# Patient Record
Sex: FEMALE | Race: BLACK OR AFRICAN AMERICAN | Employment: OTHER | URBAN - METROPOLITAN AREA
[De-identification: names, ages, dates, MRNs, and addresses within clinical notes are randomized per-mention and may not be internally consistent; named-entity substitution may affect disease eponyms.]

---

## 2017-03-26 ENCOUNTER — ED HISTORICAL/CONVERTED ENCOUNTER (OUTPATIENT)
Dept: OTHER | Age: 62
End: 2017-03-26

## 2017-04-12 ENCOUNTER — OP HISTORICAL/CONVERTED ENCOUNTER (OUTPATIENT)
Dept: OTHER | Age: 62
End: 2017-04-12

## 2018-03-01 ENCOUNTER — OP HISTORICAL/CONVERTED ENCOUNTER (OUTPATIENT)
Dept: OTHER | Age: 63
End: 2018-03-01

## 2018-06-04 ENCOUNTER — OP HISTORICAL/CONVERTED ENCOUNTER (OUTPATIENT)
Dept: OTHER | Age: 63
End: 2018-06-04

## 2020-12-29 ENCOUNTER — HOSPITAL ENCOUNTER (EMERGENCY)
Age: 65
Discharge: LEFT BEFORE DISCHARGE | End: 2020-12-29
Payer: MEDICARE

## 2020-12-29 VITALS
OXYGEN SATURATION: 98 % | HEIGHT: 60 IN | HEART RATE: 86 BPM | TEMPERATURE: 98 F | BODY MASS INDEX: 45.16 KG/M2 | SYSTOLIC BLOOD PRESSURE: 180 MMHG | RESPIRATION RATE: 20 BRPM | WEIGHT: 230 LBS | DIASTOLIC BLOOD PRESSURE: 89 MMHG

## 2020-12-29 PROCEDURE — 75810000275 HC EMERGENCY DEPT VISIT NO LEVEL OF CARE

## 2022-10-28 ENCOUNTER — APPOINTMENT (OUTPATIENT)
Dept: GENERAL RADIOLOGY | Age: 67
End: 2022-10-28
Attending: EMERGENCY MEDICINE
Payer: MEDICARE

## 2022-10-28 ENCOUNTER — HOSPITAL ENCOUNTER (OUTPATIENT)
Age: 67
Setting detail: OBSERVATION
Discharge: HOME | End: 2022-10-29
Attending: EMERGENCY MEDICINE | Admitting: HOSPITALIST
Payer: MEDICARE

## 2022-10-28 DIAGNOSIS — R07.9 CHEST PAIN, UNSPECIFIED TYPE: Primary | ICD-10-CM

## 2022-10-28 LAB
ALBUMIN SERPL-MCNC: 2.9 G/DL (ref 3.5–5)
ALBUMIN/GLOB SERPL: 0.6 {RATIO} (ref 1.1–2.2)
ALP SERPL-CCNC: 74 U/L (ref 45–117)
ALT SERPL-CCNC: 13 U/L (ref 12–78)
ANION GAP SERPL CALC-SCNC: 4 MMOL/L (ref 5–15)
AST SERPL W P-5'-P-CCNC: 8 U/L (ref 15–37)
BASOPHILS # BLD: 0 K/UL (ref 0–0.1)
BASOPHILS NFR BLD: 0 % (ref 0–1)
BILIRUB SERPL-MCNC: 0.2 MG/DL (ref 0.2–1)
BUN SERPL-MCNC: 16 MG/DL (ref 6–20)
BUN/CREAT SERPL: 16 (ref 12–20)
CA-I BLD-MCNC: 8.7 MG/DL (ref 8.5–10.1)
CHLORIDE SERPL-SCNC: 110 MMOL/L (ref 97–108)
CO2 SERPL-SCNC: 27 MMOL/L (ref 21–32)
CREAT SERPL-MCNC: 0.97 MG/DL (ref 0.55–1.02)
DIFFERENTIAL METHOD BLD: ABNORMAL
EOSINOPHIL # BLD: 0.2 K/UL (ref 0–0.4)
EOSINOPHIL NFR BLD: 2 % (ref 0–7)
ERYTHROCYTE [DISTWIDTH] IN BLOOD BY AUTOMATED COUNT: 14.2 % (ref 11.5–14.5)
GLOBULIN SER CALC-MCNC: 4.5 G/DL (ref 2–4)
GLUCOSE SERPL-MCNC: 111 MG/DL (ref 65–100)
HCT VFR BLD AUTO: 32.9 % (ref 35–47)
HGB BLD-MCNC: 10.1 G/DL (ref 11.5–16)
IMM GRANULOCYTES # BLD AUTO: 0 K/UL (ref 0–0.04)
IMM GRANULOCYTES NFR BLD AUTO: 0 % (ref 0–0.5)
LYMPHOCYTES # BLD: 3 K/UL (ref 0.8–3.5)
LYMPHOCYTES NFR BLD: 46 % (ref 12–49)
MCH RBC QN AUTO: 26.7 PG (ref 26–34)
MCHC RBC AUTO-ENTMCNC: 30.7 G/DL (ref 30–36.5)
MCV RBC AUTO: 87 FL (ref 80–99)
MONOCYTES # BLD: 0.6 K/UL (ref 0–1)
MONOCYTES NFR BLD: 10 % (ref 5–13)
NEUTS SEG # BLD: 2.8 K/UL (ref 1.8–8)
NEUTS SEG NFR BLD: 42 % (ref 32–75)
NRBC # BLD: 0 K/UL (ref 0–0.01)
NRBC BLD-RTO: 0 PER 100 WBC
PLATELET # BLD AUTO: 342 K/UL (ref 150–400)
PMV BLD AUTO: 8.8 FL (ref 8.9–12.9)
POTASSIUM SERPL-SCNC: 4.1 MMOL/L (ref 3.5–5.1)
PROT SERPL-MCNC: 7.4 G/DL (ref 6.4–8.2)
RBC # BLD AUTO: 3.78 M/UL (ref 3.8–5.2)
SODIUM SERPL-SCNC: 141 MMOL/L (ref 136–145)
TROPONIN-HIGH SENSITIVITY: 4 NG/L (ref 0–51)
WBC # BLD AUTO: 6.6 K/UL (ref 3.6–11)

## 2022-10-28 PROCEDURE — 93005 ELECTROCARDIOGRAM TRACING: CPT

## 2022-10-28 PROCEDURE — 71045 X-RAY EXAM CHEST 1 VIEW: CPT

## 2022-10-28 PROCEDURE — 85025 COMPLETE CBC W/AUTO DIFF WBC: CPT

## 2022-10-28 PROCEDURE — 36415 COLL VENOUS BLD VENIPUNCTURE: CPT

## 2022-10-28 PROCEDURE — 80053 COMPREHEN METABOLIC PANEL: CPT

## 2022-10-28 PROCEDURE — 84484 ASSAY OF TROPONIN QUANT: CPT

## 2022-10-28 PROCEDURE — 99285 EMERGENCY DEPT VISIT HI MDM: CPT

## 2022-10-28 RX ORDER — ASPIRIN 81 MG/1
81 TABLET ORAL DAILY
Status: DISCONTINUED | OUTPATIENT
Start: 2022-10-29 | End: 2022-10-29 | Stop reason: HOSPADM

## 2022-10-28 RX ORDER — SODIUM CHLORIDE 0.9 % (FLUSH) 0.9 %
5-40 SYRINGE (ML) INJECTION AS NEEDED
Status: DISCONTINUED | OUTPATIENT
Start: 2022-10-28 | End: 2022-10-29 | Stop reason: HOSPADM

## 2022-10-28 RX ORDER — ACETAMINOPHEN 325 MG/1
650 TABLET ORAL
Status: DISCONTINUED | OUTPATIENT
Start: 2022-10-28 | End: 2022-10-29 | Stop reason: HOSPADM

## 2022-10-28 RX ORDER — NITROGLYCERIN 0.4 MG/1
0.4 TABLET SUBLINGUAL
Status: DISCONTINUED | OUTPATIENT
Start: 2022-10-28 | End: 2022-10-29 | Stop reason: HOSPADM

## 2022-10-28 RX ORDER — ENOXAPARIN SODIUM 100 MG/ML
30 INJECTION SUBCUTANEOUS EVERY 12 HOURS
Status: DISCONTINUED | OUTPATIENT
Start: 2022-10-29 | End: 2022-10-29 | Stop reason: HOSPADM

## 2022-10-28 RX ORDER — SODIUM CHLORIDE 0.9 % (FLUSH) 0.9 %
5-40 SYRINGE (ML) INJECTION EVERY 8 HOURS
Status: DISCONTINUED | OUTPATIENT
Start: 2022-10-28 | End: 2022-10-29 | Stop reason: HOSPADM

## 2022-10-28 RX ORDER — ONDANSETRON 4 MG/1
4 TABLET, ORALLY DISINTEGRATING ORAL
Status: DISCONTINUED | OUTPATIENT
Start: 2022-10-28 | End: 2022-10-29 | Stop reason: HOSPADM

## 2022-10-29 VITALS
OXYGEN SATURATION: 100 % | HEART RATE: 74 BPM | BODY MASS INDEX: 54.97 KG/M2 | HEIGHT: 60 IN | DIASTOLIC BLOOD PRESSURE: 76 MMHG | RESPIRATION RATE: 16 BRPM | WEIGHT: 280 LBS | TEMPERATURE: 97.8 F | SYSTOLIC BLOOD PRESSURE: 134 MMHG

## 2022-10-29 LAB
ANION GAP SERPL CALC-SCNC: 4 MMOL/L (ref 5–15)
ATRIAL RATE: 60 BPM
BUN SERPL-MCNC: 15 MG/DL (ref 6–20)
BUN/CREAT SERPL: 16 (ref 12–20)
CA-I BLD-MCNC: 8.4 MG/DL (ref 8.5–10.1)
CALCULATED P AXIS, ECG09: 57 DEGREES
CALCULATED R AXIS, ECG10: 1 DEGREES
CALCULATED T AXIS, ECG11: 10 DEGREES
CHLORIDE SERPL-SCNC: 111 MMOL/L (ref 97–108)
CHOLEST SERPL-MCNC: 245 MG/DL
CK SERPL-CCNC: 59 U/L (ref 26–192)
CO2 SERPL-SCNC: 27 MMOL/L (ref 21–32)
CREAT SERPL-MCNC: 0.92 MG/DL (ref 0.55–1.02)
DIAGNOSIS, 93000: NORMAL
GLUCOSE SERPL-MCNC: 104 MG/DL (ref 65–100)
HDLC SERPL-MCNC: 49 MG/DL
HDLC SERPL: 5 {RATIO} (ref 0–5)
LDLC SERPL CALC-MCNC: 180 MG/DL (ref 0–100)
LIPID PROFILE,FLP: ABNORMAL
P-R INTERVAL, ECG05: 188 MS
POTASSIUM SERPL-SCNC: 4.2 MMOL/L (ref 3.5–5.1)
Q-T INTERVAL, ECG07: 412 MS
QRS DURATION, ECG06: 78 MS
QTC CALCULATION (BEZET), ECG08: 412 MS
SODIUM SERPL-SCNC: 142 MMOL/L (ref 136–145)
TRIGL SERPL-MCNC: 80 MG/DL (ref ?–150)
TROPONIN-HIGH SENSITIVITY: 4 NG/L (ref 0–51)
TROPONIN-HIGH SENSITIVITY: 4 NG/L (ref 0–51)
TSH SERPL DL<=0.05 MIU/L-ACNC: 1.57 UIU/ML (ref 0.36–3.74)
VENTRICULAR RATE, ECG03: 60 BPM
VLDLC SERPL CALC-MCNC: 16 MG/DL

## 2022-10-29 PROCEDURE — 93005 ELECTROCARDIOGRAM TRACING: CPT

## 2022-10-29 PROCEDURE — 84443 ASSAY THYROID STIM HORMONE: CPT

## 2022-10-29 PROCEDURE — 74011000250 HC RX REV CODE- 250: Performed by: HOSPITALIST

## 2022-10-29 PROCEDURE — 80061 LIPID PANEL: CPT

## 2022-10-29 PROCEDURE — 96372 THER/PROPH/DIAG INJ SC/IM: CPT

## 2022-10-29 PROCEDURE — G0378 HOSPITAL OBSERVATION PER HR: HCPCS

## 2022-10-29 PROCEDURE — 80048 BASIC METABOLIC PNL TOTAL CA: CPT

## 2022-10-29 PROCEDURE — 36415 COLL VENOUS BLD VENIPUNCTURE: CPT

## 2022-10-29 PROCEDURE — 74011250636 HC RX REV CODE- 250/636: Performed by: HOSPITALIST

## 2022-10-29 PROCEDURE — 82550 ASSAY OF CK (CPK): CPT

## 2022-10-29 PROCEDURE — 84484 ASSAY OF TROPONIN QUANT: CPT

## 2022-10-29 PROCEDURE — 74011250637 HC RX REV CODE- 250/637: Performed by: HOSPITALIST

## 2022-10-29 RX ORDER — ASPIRIN 81 MG/1
81 TABLET ORAL DAILY
Qty: 30 TABLET | Refills: 0 | Status: SHIPPED
Start: 2022-10-30

## 2022-10-29 RX ADMIN — SODIUM CHLORIDE, PRESERVATIVE FREE 10 ML: 5 INJECTION INTRAVENOUS at 05:49

## 2022-10-29 RX ADMIN — ENOXAPARIN SODIUM 30 MG: 100 INJECTION SUBCUTANEOUS at 08:10

## 2022-10-29 RX ADMIN — SODIUM CHLORIDE, PRESERVATIVE FREE 10 ML: 5 INJECTION INTRAVENOUS at 02:40

## 2022-10-29 RX ADMIN — ASPIRIN 81 MG: 81 TABLET, COATED ORAL at 08:10

## 2022-10-29 RX ADMIN — ENOXAPARIN SODIUM 30 MG: 100 INJECTION SUBCUTANEOUS at 02:29

## 2022-10-29 NOTE — PROGRESS NOTES
Hospitalist Progress Note               Daily Progress Note: 10/29/2022      Subjective: The patient is seen for follow up. Patient is a 44-year-old female with a history of GERD and gastric sleeve surgery for obesity but no other medical problems who presented to the ED on the evening of 10/28 with chest tightness intermittently times several days, along with shortness of breath. WILSON score was only 1. She was admitted for further evaluation and cardiology consultation. Echocardiogram was ordered    -------  Patient is seen for follow-up. Patient is seen for follow-up. She feels much better this morning. Troponin remains normal    Problem List:  Problem List as of 10/29/2022 Date Reviewed: 10/28/2022            Codes Class Noted - Resolved    Chest pain ICD-10-CM: R07.9  ICD-9-CM: 786.50  10/28/2022 - Present           Medications reviewed  Current Facility-Administered Medications   Medication Dose Route Frequency    sodium chloride (NS) flush 5-40 mL  5-40 mL IntraVENous Q8H    sodium chloride (NS) flush 5-40 mL  5-40 mL IntraVENous PRN    aspirin delayed-release tablet 81 mg  81 mg Oral DAILY    nitroglycerin (NITROSTAT) tablet 0.4 mg  0.4 mg SubLINGual Q5MIN PRN    acetaminophen (TYLENOL) tablet 650 mg  650 mg Oral Q6H PRN    ondansetron (ZOFRAN ODT) tablet 4 mg  4 mg Oral Q6H PRN    enoxaparin (LOVENOX) injection 30 mg  30 mg SubCUTAneous Q12H       Review of Systems:   A comprehensive review of systems was negative except for that written in the HPI. Objective:   Physical Exam:     Visit Vitals  BP (!) 150/72 (BP 1 Location: Left upper arm, BP Patient Position: At rest;Sitting)   Pulse 63   Temp 97.7 °F (36.5 °C)   Resp 15   Ht 5' (1.524 m)   Wt 127 kg (280 lb)   SpO2 100%   BMI 54.68 kg/m²      O2 Device: None (Room air)    Temp (24hrs), Av.8 °F (36.6 °C), Min:97.4 °F (36.3 °C), Max:98.2 °F (36.8 °C)    No intake/output data recorded. No intake/output data recorded.     General: Awake and alert   Lungs:   Clear to auscultation bilaterally. Chest wall:  No tenderness or deformity. Heart:  Regular rate and rhythm, S1, S2 normal, no murmur, click, rub or gallop. Abdomen:   Soft, non-tender. Bowel sounds normal. No masses,  No organomegaly. Extremities: Extremities normal, atraumatic, no cyanosis or edema. Pulses: 2+ and symmetric all extremities. Skin: Skin color, texture, turgor normal. No rashes or lesions   Neurologic: CNII-XII intact. No gross focal deficits         Data Review:       Recent Days:  Recent Labs     10/28/22  2145   WBC 6.6   HGB 10.1*   HCT 32.9*        Recent Labs     10/29/22  0030 10/28/22  2145    141   K 4.2 4.1   * 110*   CO2 27 27   * 111*   BUN 15 16   CREA 0.92 0.97   CA 8.4* 8.7   ALB  --  2.9*   TBILI  --  0.2   ALT  --  13     No results for input(s): PH, PCO2, PO2, HCO3, FIO2 in the last 72 hours. 24 Hour Results:  Recent Results (from the past 24 hour(s))   CBC WITH AUTOMATED DIFF    Collection Time: 10/28/22  9:45 PM   Result Value Ref Range    WBC 6.6 3.6 - 11.0 K/uL    RBC 3.78 (L) 3.80 - 5.20 M/uL    HGB 10.1 (L) 11.5 - 16.0 g/dL    HCT 32.9 (L) 35.0 - 47.0 %    MCV 87.0 80.0 - 99.0 FL    MCH 26.7 26.0 - 34.0 PG    MCHC 30.7 30.0 - 36.5 g/dL    RDW 14.2 11.5 - 14.5 %    PLATELET 031 850 - 833 K/uL    MPV 8.8 (L) 8.9 - 12.9 FL    NRBC 0.0 0.0  WBC    ABSOLUTE NRBC 0.00 0.00 - 0.01 K/uL    NEUTROPHILS 42 32 - 75 %    LYMPHOCYTES 46 12 - 49 %    MONOCYTES 10 5 - 13 %    EOSINOPHILS 2 0 - 7 %    BASOPHILS 0 0 - 1 %    IMMATURE GRANULOCYTES 0 0 - 0.5 %    ABS. NEUTROPHILS 2.8 1.8 - 8.0 K/UL    ABS. LYMPHOCYTES 3.0 0.8 - 3.5 K/UL    ABS. MONOCYTES 0.6 0.0 - 1.0 K/UL    ABS. EOSINOPHILS 0.2 0.0 - 0.4 K/UL    ABS. BASOPHILS 0.0 0.0 - 0.1 K/UL    ABS. IMM.  GRANS. 0.0 0.00 - 0.04 K/UL    DF AUTOMATED     METABOLIC PANEL, COMPREHENSIVE    Collection Time: 10/28/22  9:45 PM   Result Value Ref Range    Sodium 141 136 - 145 mmol/L    Potassium 4.1 3.5 - 5.1 mmol/L    Chloride 110 (H) 97 - 108 mmol/L    CO2 27 21 - 32 mmol/L    Anion gap 4 (L) 5 - 15 mmol/L    Glucose 111 (H) 65 - 100 mg/dL    BUN 16 6 - 20 mg/dL    Creatinine 0.97 0.55 - 1.02 mg/dL    BUN/Creatinine ratio 16 12 - 20      eGFR >60 >60 ml/min/1.73m2    Calcium 8.7 8.5 - 10.1 mg/dL    Bilirubin, total 0.2 0.2 - 1.0 mg/dL    AST (SGOT) 8 (L) 15 - 37 U/L    ALT (SGPT) 13 12 - 78 U/L    Alk.  phosphatase 74 45 - 117 U/L    Protein, total 7.4 6.4 - 8.2 g/dL    Albumin 2.9 (L) 3.5 - 5.0 g/dL    Globulin 4.5 (H) 2.0 - 4.0 g/dL    A-G Ratio 0.6 (L) 1.1 - 2.2     TROPONIN-HIGH SENSITIVITY    Collection Time: 10/28/22  9:45 PM   Result Value Ref Range    Troponin-High Sensitivity 4 0 - 51 ng/L   TROPONIN-HIGH SENSITIVITY    Collection Time: 10/29/22 12:30 AM   Result Value Ref Range    Troponin-High Sensitivity 4 0 - 51 ng/L   METABOLIC PANEL, BASIC    Collection Time: 10/29/22 12:30 AM   Result Value Ref Range    Sodium 142 136 - 145 mmol/L    Potassium 4.2 3.5 - 5.1 mmol/L    Chloride 111 (H) 97 - 108 mmol/L    CO2 27 21 - 32 mmol/L    Anion gap 4 (L) 5 - 15 mmol/L    Glucose 104 (H) 65 - 100 mg/dL    BUN 15 6 - 20 mg/dL    Creatinine 0.92 0.55 - 1.02 mg/dL    BUN/Creatinine ratio 16 12 - 20      eGFR >60 >60 ml/min/1.73m2    Calcium 8.4 (L) 8.5 - 10.1 mg/dL   TSH 3RD GENERATION    Collection Time: 10/29/22 12:30 AM   Result Value Ref Range    TSH 1.57 0.36 - 3.74 uIU/mL   CK    Collection Time: 10/29/22 12:30 AM   Result Value Ref Range    CK 59 26 - 192 U/L       XR CHEST PORT   Final Result      No acute cardiopulmonary process              Assessment:  Atypical chest pain, WILSON score 1   -Likely GI etiology    Morbid obesity, history of gastric sleeve procedure      Plan:  Await echocardiogram and cardiology consultation  Probable discharge later today with further work-up in the outpatient setting      Care Plan discussed with: Patient/Family    Disposition:     Total time spent with patient: 30 minutes.     Simon Salas MD

## 2022-10-29 NOTE — PROGRESS NOTES
Problem: Falls - Risk of  Goal: *Absence of Falls  Description: Document Vernia Colder Fall Risk and appropriate interventions in the flowsheet.   Outcome: Progressing Towards Goal  Note: Fall Risk Interventions:            Medication Interventions: Assess postural VS orthostatic hypotension                   Problem: Patient Education: Go to Patient Education Activity  Goal: Patient/Family Education  Outcome: Progressing Towards Goal

## 2022-10-29 NOTE — ED PROVIDER NOTES
EMERGENCY DEPARTMENT HISTORY AND PHYSICAL EXAM      Date: 10/28/2022  Patient Name: Irene Ron    History of Presenting Illness     Chief Complaint   Patient presents with    Chest Pain       History Provided By: Patient    HPI: Irene Ron, 75 y.o. female presenting to the emergency department for evaluation of substernal chest pain. Patient reports symptoms feel like someone is \"sitting on my chest.\". Reports associated radiation to the left arm as well as to the left jaw in addition shortness of breath. Patient denies lower extremity swelling or history of DVT/PE. Endorses some associated orthopnea. States symptoms are not provoked by exertion or physical activity. Has never had these symptoms previously. No prior known cardiac disease. There are no other complaints, changes, or physical findings at this time. PCP: QUIQUE Villa    No current facility-administered medications on file prior to encounter. No current outpatient medications on file prior to encounter. Past History     Past Medical History:  Past Medical History:   Diagnosis Date    Obesity        Past Surgical History:  Past Surgical History:   Procedure Laterality Date    HX CHOLECYSTECTOMY      HX GASTRECTOMY  2013    HX HYSTERECTOMY  1998       Family History:  Family History   Problem Relation Age of Onset    Cancer Mother     Diabetes Father     Heart Disease Father        Social History:  Social History     Tobacco Use    Smoking status: Never    Smokeless tobacco: Never   Substance Use Topics    Alcohol use: Never    Drug use: Never       Allergies: Allergies   Allergen Reactions    Milk Containing Products Nausea and Vomiting and Sneezing    Pcn [Penicillins] Vertigo       Review of Systems   Review of Systems   Constitutional:  Negative for chills and fever. HENT:  Negative for congestion and sore throat. Eyes:  Negative for pain and visual disturbance.    Respiratory:  Positive for shortness of breath. Negative for cough. Cardiovascular:  Positive for chest pain. Negative for palpitations. Gastrointestinal:  Positive for nausea. Negative for constipation, diarrhea and vomiting. Genitourinary:  Negative for dysuria and frequency. Musculoskeletal:  Negative for arthralgias and myalgias. Skin:  Negative for color change and rash. Neurological:  Negative for dizziness, weakness, light-headedness and headaches. Psychiatric/Behavioral:  Negative for dysphoric mood and sleep disturbance. Physical Exam   Physical Exam  Constitutional:       Appearance: Normal appearance. HENT:      Head: Normocephalic and atraumatic. Right Ear: External ear normal.      Left Ear: External ear normal.      Nose: Nose normal.      Mouth/Throat:      Mouth: Mucous membranes are moist.   Eyes:      Extraocular Movements: Extraocular movements intact. Conjunctiva/sclera: Conjunctivae normal.   Cardiovascular:      Rate and Rhythm: Normal rate and regular rhythm. Pulses: Normal pulses. Heart sounds: Normal heart sounds. Pulmonary:      Effort: Pulmonary effort is normal.      Breath sounds: Normal breath sounds. Abdominal:      General: Abdomen is flat. There is no distension. Palpations: Abdomen is soft. Tenderness: There is no abdominal tenderness. Musculoskeletal:         General: Normal range of motion. Cervical back: Normal range of motion. Skin:     General: Skin is warm and dry. Capillary Refill: Capillary refill takes less than 2 seconds. Neurological:      General: No focal deficit present. Mental Status: She is alert and oriented to person, place, and time. Mental status is at baseline.    Psychiatric:         Mood and Affect: Mood normal.         Behavior: Behavior normal.       Lab and Diagnostic Study Results   Labs -     Recent Results (from the past 12 hour(s))   CBC WITH AUTOMATED DIFF    Collection Time: 10/28/22  9:45 PM   Result Value Ref Range    WBC 6.6 3.6 - 11.0 K/uL    RBC 3.78 (L) 3.80 - 5.20 M/uL    HGB 10.1 (L) 11.5 - 16.0 g/dL    HCT 32.9 (L) 35.0 - 47.0 %    MCV 87.0 80.0 - 99.0 FL    MCH 26.7 26.0 - 34.0 PG    MCHC 30.7 30.0 - 36.5 g/dL    RDW 14.2 11.5 - 14.5 %    PLATELET 721 924 - 114 K/uL    MPV 8.8 (L) 8.9 - 12.9 FL    NRBC 0.0 0.0  WBC    ABSOLUTE NRBC 0.00 0.00 - 0.01 K/uL    NEUTROPHILS 42 32 - 75 %    LYMPHOCYTES 46 12 - 49 %    MONOCYTES 10 5 - 13 %    EOSINOPHILS 2 0 - 7 %    BASOPHILS 0 0 - 1 %    IMMATURE GRANULOCYTES 0 0 - 0.5 %    ABS. NEUTROPHILS 2.8 1.8 - 8.0 K/UL    ABS. LYMPHOCYTES 3.0 0.8 - 3.5 K/UL    ABS. MONOCYTES 0.6 0.0 - 1.0 K/UL    ABS. EOSINOPHILS 0.2 0.0 - 0.4 K/UL    ABS. BASOPHILS 0.0 0.0 - 0.1 K/UL    ABS. IMM. GRANS. 0.0 0.00 - 0.04 K/UL    DF AUTOMATED     METABOLIC PANEL, COMPREHENSIVE    Collection Time: 10/28/22  9:45 PM   Result Value Ref Range    Sodium 141 136 - 145 mmol/L    Potassium 4.1 3.5 - 5.1 mmol/L    Chloride 110 (H) 97 - 108 mmol/L    CO2 27 21 - 32 mmol/L    Anion gap 4 (L) 5 - 15 mmol/L    Glucose 111 (H) 65 - 100 mg/dL    BUN 16 6 - 20 mg/dL    Creatinine 0.97 0.55 - 1.02 mg/dL    BUN/Creatinine ratio 16 12 - 20      eGFR >60 >60 ml/min/1.73m2    Calcium 8.7 8.5 - 10.1 mg/dL    Bilirubin, total 0.2 0.2 - 1.0 mg/dL    AST (SGOT) 8 (L) 15 - 37 U/L    ALT (SGPT) 13 12 - 78 U/L    Alk.  phosphatase 74 45 - 117 U/L    Protein, total 7.4 6.4 - 8.2 g/dL    Albumin 2.9 (L) 3.5 - 5.0 g/dL    Globulin 4.5 (H) 2.0 - 4.0 g/dL    A-G Ratio 0.6 (L) 1.1 - 2.2     TROPONIN-HIGH SENSITIVITY    Collection Time: 10/28/22  9:45 PM   Result Value Ref Range    Troponin-High Sensitivity 4 0 - 51 ng/L   TROPONIN-HIGH SENSITIVITY    Collection Time: 10/29/22 12:30 AM   Result Value Ref Range    Troponin-High Sensitivity 4 0 - 51 ng/L   METABOLIC PANEL, BASIC    Collection Time: 10/29/22 12:30 AM   Result Value Ref Range    Sodium 142 136 - 145 mmol/L    Potassium 4.2 3.5 - 5.1 mmol/L    Chloride 111 (H) 97 - 108 mmol/L    CO2 27 21 - 32 mmol/L    Anion gap 4 (L) 5 - 15 mmol/L    Glucose 104 (H) 65 - 100 mg/dL    BUN 15 6 - 20 mg/dL    Creatinine 0.92 0.55 - 1.02 mg/dL    BUN/Creatinine ratio 16 12 - 20      eGFR >60 >60 ml/min/1.73m2    Calcium 8.4 (L) 8.5 - 10.1 mg/dL   TSH 3RD GENERATION    Collection Time: 10/29/22 12:30 AM   Result Value Ref Range    TSH 1.57 0.36 - 3.74 uIU/mL   CK    Collection Time: 10/29/22 12:30 AM   Result Value Ref Range    CK 59 26 - 192 U/L       Radiologic Studies -   @[email or web address]@  CT Results  (Last 48 hours)      None          CXR Results  (Last 48 hours)                 10/28/22 2204  XR CHEST PORT Final result    Impression:      No acute cardiopulmonary process           Narrative:  EXAM:  XR CHEST PORT       INDICATION: Chest pain       COMPARISON: none       TECHNIQUE: Upright portable chest AP view       FINDINGS: Cardiac monitoring leads. The cardiac silhouette is within normal   limits. The pulmonary vasculature is within normal limits. The lungs and pleural spaces are clear. The visualized bones and upper abdomen   are age-appropriate. Medical Decision Making and ED Course   Differential Diagnosis & Medical Decision Making Provider Note:   59-year-old female presenting to the emergency department for evaluation of substernal chest pain with associated left arm and left jaw pain. On exam, patient in no apparent distress. She is hypertensive, however vitals otherwise without significant abnormality. Trace bilateral lower extremity swelling. Lungs clear bilaterally. CBC, CMP, troponin x2, EKG, chest x-ray without significant abnormality. Heart score: 4. Will admit for cardiac ops secondary to concerning story. - I am the first provider for this patient. I reviewed the vital signs, available nursing notes, past medical history, past surgical history, family history and social history.  The patients presenting problems have been discussed, and they are in agreement with the care plan formulated and outlined with them. I have encouraged them to ask questions as they arise throughout their visit. Vital Signs-Reviewed the patient's vital signs. Patient Vitals for the past 12 hrs:   Temp Pulse Resp BP SpO2   10/29/22 0709 97.7 °F (36.5 °C) 63 15 (!) 150/72 100 %   10/29/22 0400 -- 60 -- -- --   10/29/22 0327 98.2 °F (36.8 °C) 69 19 128/68 100 %   10/29/22 0152 97.4 °F (36.3 °C) 79 22 131/71 98 %   10/29/22 0132 -- -- -- 119/68 100 %   10/29/22 0020 -- 68 18 -- 97 %   10/29/22 0000 -- 78 -- -- --   10/28/22 2119 97.7 °F (36.5 °C) 80 18 (!) 152/81 99 %       ED Course:    Composition of the HEART score for chest pain, angina, NSTEMI in the ED. HEART score criteria             Score  History: Highly suspicious    2    Moderately suspicious   1    Slightly or non-suspicious   0    ECG:  Significant ST depression   2    Nonspecific repolarisation disturbance 1    Normal      0    Age:  > or = 65 years    2    > 45 to < 65 years    1    < Or = to 45 years    0    Risk factors: > or = to 3 risk factors or history of CAD 2    1 or 2 risk factors    1    No risk factors known    0    Troponin: > or = to 3x normal limit   2    > 1 to < 3x normal limit   1    < or = to normal limit    0    Calculated Total : ______    ED Heart Score  History: Moderately Suspicious  ECG: Normal  Age: Greater than or equal to 65 years  Risk Factors: 1 or 2 risk factors  Troponin: Less than or equal to normal limit  HEART Score Total : 4    0-3: 0.9-1.7% risk of adverse cardiac event. In the HEART Score, these patients were  discharged. 4-6: 12-16.6% risk of adverse cardiac event. In the HEART Score, these patients were  admitted to the hospital.   =7: 50-65% risk of adverse cardiac event. In the HEART Score, these patients were  candidates for early invasive measures.            Procedures   Performed by: Itzel Randall DO  Procedures    D our time and has notisposition       Diagnosis/Clinical Impression     Clinical Impression:   1. Chest pain, unspecified type        Attestations: Molina Gongora DO, am the primary clinician of record. Please note that this dictation was completed with [employer] voice recognition software. Quite often unanticipated grammatical, syntax, homophones, and other interpretive errors are inadvertently transcribed by the computer software. Please disregard these errors. Please excuse any errors that have escaped final proofreading. Thank you.

## 2022-10-29 NOTE — CONSULTS
CARDIOLOGY CONSULTATION      REASON FOR CONSULT: Chest pain    REQUESTING PROVIDER: Dr. Kaley Bell:  Chest pressure    HISTORY OF PRESENT ILLNESS:  Valarie Stewart is a 75y.o. year-old female with past medical history significant for a history of morbid obesity s/p gastric sleeve and GERD who presented to the ED with acute onset of chest pressure. She works in a school front office and began to feel chest pressure yesterday while carrying some heavy files. She went home and given persisting symptoms she sought care in the ED. She also reports having had a very mild case of COVID a few months ago but has been dyspneic since with minimal activity, NYHA class II. She has never had any cardiac concerns but does a family history of CAD as her father had a CABG in his 46s. Upon evaluation this morning she is chest pain free. EKG is unavailable for review with repeat pending. Troponin negative x 3. Chest x-ray with no acute process and no events on telemetry. Records from hospital admission course thus far reviewed. Telemetry reviewed. No events overnight. Remains in NSR . INPATIENT MEDICATIONS:  Home medications reviewed.     Current Facility-Administered Medications:     sodium chloride (NS) flush 5-40 mL, 5-40 mL, IntraVENous, Q8H, Nima Ballard MD, 10 mL at 10/29/22 0549    sodium chloride (NS) flush 5-40 mL, 5-40 mL, IntraVENous, PRN, Nima Ballard MD    aspirin delayed-release tablet 81 mg, 81 mg, Oral, DAILY, Nima Ballard MD, 81 mg at 10/29/22 0810    nitroglycerin (NITROSTAT) tablet 0.4 mg, 0.4 mg, SubLINGual, Q5MIN PRN, Nima Ballard MD    acetaminophen (TYLENOL) tablet 650 mg, 650 mg, Oral, Q6H PRN, Nima Ballard MD    ondansetron (ZOFRAN ODT) tablet 4 mg, 4 mg, Oral, Q6H PRN, Nima Ballard MD    enoxaparin (LOVENOX) injection 30 mg, 30 mg, SubCUTAneous, Q12H, Nima Ballard MD, 30 mg at 10/29/22 6172 ALLERGIES:  Allergies reviewed with the patient,  Allergies   Allergen Reactions    Milk Containing Products Nausea and Vomiting and Sneezing    Pcn [Penicillins] Vertigo    . FAMILY HISTORY:  Family history reviewed. Father with CAD s/p CABG in his 46s. SOCIAL HISTORY:  Notable for no  tobacco use, no heavy alcohol or illicit drug use. REVIEW OF SYSTEMS:  Complete review of systems performed, pertinents noted above, all other systems are negative. PHYSICAL EXAMINATION:  Vital sign assessment reveal a blood pressure of  150/72  and pulse rate of 63. Cardiovascular exam has a heart with a regular rate and rhythm, normal S1 and S2. No murmur present. There are no rubs or gallops. Good peripheral pulses. No jugular venous distension. No carotid bruits are present. Respiratory exam reveals clear lung fields, no rales or rhonchi. Gastrointestinal exam has soft, nontender abdomen with normal bowel sounds. Lymphatic exam reveals no edema and no varicosities. No notable skin changes. Neurologic exam is nonfocal.  Musculoskeletal exam is notable for a normal gait. Visit Vitals  BP (!) 150/72 (BP 1 Location: Left upper arm, BP Patient Position: At rest;Sitting)   Pulse 63   Temp 97.7 °F (36.5 °C)   Resp 15   Ht 5' (1.524 m)   Wt 127 kg (280 lb)   SpO2 100%   BMI 54.68 kg/m²         Recent labs results and imaging reviewed. Discussed case with Dr. Ulysses Robson and our impression and recommendations are as follows:  Chest pain:  Acute onset with spontaneous resolution. She has ruled out for ACS. Pending EKG being with no ischemic changes we discussed setting her up for a nuclear stress test as an outpatient at [facility] early next week. She is agreeable to this plan. Elevated blood pressure:  No history with intermittent elevations since admission.   Will plan to obtain an echo as an OP to assess for LVH, etc. Will continue to follow as an OP and treat as indicated. Strong family history of premature CAD:  Reports her father having undergone CABG in his early 46s. Ischemic evaluation as planned. Will also address other risk factors as an OP. She is considered stable for discharge pending EKG. [facility] to call her to schedule an OP nuclear stress test on Monday morning to be scheduled likely for Tuesday 11/1. Thank you for involving us in the care of this patient. Please do not hesitate to call me or Dr. Danica Hirsch if additional questions arise.     Braydon Bear NP  10/29/2022

## 2022-10-29 NOTE — H&P
Hospitalist History & Physical Notes. [facility]. Name : Lele Cheema      MRN number : [id number]     YOB: 1955     Subjective :   Chief Complaint : Chest pain with shortness of breath     Source of information : Patient, ED provider, no previous medical records. History of present illness:   79-year-old morbidly obese female with a history of gastric sleeve surgery for obesity and no other medical problems and not on any medications, no previous cardiac history presents to the emergency room complaining of chest tightness and pain that he started few days ago intermittently worse with activity. This morning started having shortness of breath getting worse. Denies any cough or trouble breathing, no fever or chills. No orthopnea or paroxysmal nocturnal dyspnea. No exacerbating relieving factors. Evaluation in the emergency room essentially appears normal, troponin is 4. She has family history her father  of heart attack at young age. 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 Past Medical History:   Diagnosis Date    Obesity      Past Surgical History:   Procedure Laterality Date    HX CHOLECYSTECTOMY      HX GASTRECTOMY      HX HYSTERECTOMY  1998     Family History   Problem Relation Age of Onset    Cancer Mother     Diabetes Father     Heart Disease Father       Social History     Tobacco Use    Smoking status: Never    Smokeless tobacco: Never   Substance Use Topics    Alcohol use: Never       Home medications: None      Allergies   Allergen Reactions    Milk Containing Products Nausea and Vomiting and Sneezing    Pcn [Penicillins] Vertigo             Review of Systems:  Constitutional: Appetite is good, denies weight loss, no fever, no chills, no night sweats. Eye: No recent visual disturbances, no discharge, no double vision.   Ear/nose/mouth/throat : No hearing disturbance, no ear pain, no nasal congestion, no sore throat, no trouble swallowing. Respiratory : No trouble breathing, no cough, no shortness of breath, no hemoptysis, no wheezing. Cardiovascular : As in history of present illness. Gastrointestinal : No nausea, no vomiting,   No abdominal pain. Genitourinary : No dysuria, no hematuria, . Endocrine : No excessive thirst, No polyuria . Immunologic : No hives, urticaria, No seasonal allergies. Musculoskeletal : No joint swelling, no focal weakness. .  Integumentary : No rash, No pruritus,   Hematology : No petechiae, No easy bruising,  No tendency to bleed easy. Neurology : Denies change in mental status,   Psychiatric : No mood swings, No anxiety, No depression. Vitals:   Patient Vitals for the past 12 hrs:   Temp Pulse Resp BP SpO2   10/28/22 2119 97.7 °F (36.5 °C) 80 18 (!) 152/81 99 %       Physical Exam:   General : Looks comfortable, no respiratory distress noted. HEENT : PERRLA, normal oral mucosa, atraumatic normocephalic, Normal ear and nose. Neck : Supple, no JVD, no masses noted, no carotid bruit. Lungs : Breath sounds with good air entry bilaterally, no wheezes or rales, no accessory muscle use. CVS : Rhythm rate regular, S1+, S2+, no murmur or gallop. Abdomen : Soft, nontender, obese. Bowel sounds active. Extremities : No edema noted,  pedal pulses palpable. Musculoskeletal : Fair range of motion, no joint swelling or effusion, muscle tone and power appears fair. Skin : Moist, warm,  no pathological rash. Lymphatic : No cervical lymphadenopathy. Neurological : Awake, alert, oriented to time place person. Psychiatric : Mood and affect appears appropriate to the situation.          Data Review:   Recent Results (from the past 24 hour(s))   CBC WITH AUTOMATED DIFF    Collection Time: 10/28/22  9:45 PM   Result Value Ref Range    WBC 6.6 3.6 - 11.0 K/uL    RBC 3.78 (L) 3.80 - 5.20 M/uL    HGB 10.1 (L) 11.5 - 16.0 g/dL    HCT 32.9 (L) 35.0 - 47.0 %    MCV 87.0 80.0 - 99.0 FL    MCH 26.7 26.0 - 34.0 PG    MCHC 30.7 30.0 - 36.5 g/dL    RDW 14.2 11.5 - 14.5 %    PLATELET 026 014 - 110 K/uL    MPV 8.8 (L) 8.9 - 12.9 FL    NRBC 0.0 0.0  WBC    ABSOLUTE NRBC 0.00 0.00 - 0.01 K/uL    NEUTROPHILS 42 32 - 75 %    LYMPHOCYTES 46 12 - 49 %    MONOCYTES 10 5 - 13 %    EOSINOPHILS 2 0 - 7 %    BASOPHILS 0 0 - 1 %    IMMATURE GRANULOCYTES 0 0 - 0.5 %    ABS. NEUTROPHILS 2.8 1.8 - 8.0 K/UL    ABS. LYMPHOCYTES 3.0 0.8 - 3.5 K/UL    ABS. MONOCYTES 0.6 0.0 - 1.0 K/UL    ABS. EOSINOPHILS 0.2 0.0 - 0.4 K/UL    ABS. BASOPHILS 0.0 0.0 - 0.1 K/UL    ABS. IMM. GRANS. 0.0 0.00 - 0.04 K/UL    DF AUTOMATED     METABOLIC PANEL, COMPREHENSIVE    Collection Time: 10/28/22  9:45 PM   Result Value Ref Range    Sodium 141 136 - 145 mmol/L    Potassium 4.1 3.5 - 5.1 mmol/L    Chloride 110 (H) 97 - 108 mmol/L    CO2 27 21 - 32 mmol/L    Anion gap 4 (L) 5 - 15 mmol/L    Glucose 111 (H) 65 - 100 mg/dL    BUN 16 6 - 20 mg/dL    Creatinine 0.97 0.55 - 1.02 mg/dL    BUN/Creatinine ratio 16 12 - 20      eGFR >60 >60 ml/min/1.73m2    Calcium 8.7 8.5 - 10.1 mg/dL    Bilirubin, total 0.2 0.2 - 1.0 mg/dL    AST (SGOT) 8 (L) 15 - 37 U/L    ALT (SGPT) 13 12 - 78 U/L    Alk. phosphatase 74 45 - 117 U/L    Protein, total 7.4 6.4 - 8.2 g/dL    Albumin 2.9 (L) 3.5 - 5.0 g/dL    Globulin 4.5 (H) 2.0 - 4.0 g/dL    A-G Ratio 0.6 (L) 1.1 - 2.2     TROPONIN-HIGH SENSITIVITY    Collection Time: 10/28/22  9:45 PM   Result Value Ref Range    Troponin-High Sensitivity 4 0 - 51 ng/L       Radiologic Studies :       CXR Results  (Last 48 hours)                 10/28/22 2204  XR CHEST PORT Final result    Impression:      No acute cardiopulmonary process           Narrative:  EXAM:  XR CHEST PORT       INDICATION: Chest pain       COMPARISON: none       TECHNIQUE: Upright portable chest AP view       FINDINGS: Cardiac monitoring leads. The cardiac silhouette is within normal   limits. The pulmonary vasculature is within normal limits.         The lungs and pleural spaces are clear. The visualized bones and upper abdomen   are age-appropriate. Assessment and Plan :     Chest pain: Negative cardiac markers. But patient has risk factors with a heart score of 4. But on WILSON score she is only 1. At this point we will admit her for atypical chest pain due to risk factors, have an echocardiogram and cardiology evaluation. Malnutrition: Slightly low albumin. Likely due to her history of gastrectomy with the nutritional deficiencies. Patient is counseled about importance of diet    Morbid obesity: She was trying hard for this including had surgery done. So I did not talk about this anymore. Admitted for observation to cardiac telemetry, full CODE STATUS, no home medications. Has no advance medical directives. CC : QUIQUE Grayson  Signed By: Donnell Coyne MD     October 28, 2022      This dictation was done by dragon, computer voice recognition software. Often unanticipated grammatical, syntax, [employer] phones and other interpretive errors are inadvertently transcribed. Please excuse errors that have escaped final proofreading.

## 2022-10-29 NOTE — PROGRESS NOTES
Medicare Outpatient Observation Notice (MOON)/ [place] Outpatient Observation Notice (Antoinette John) provided to patient/representative with verbal explanation of the notice. Time allotted for questions regarding the notice. Patient /representative provided a completed copy of the MOON/VOON notice. Copy placed on bedside chart. 12:15 pm- Patient will discharge home self-care. Family to transport at discharge. Patient does not require CM services. Discharge plan of care/case management plan validated with provider discharge order.

## 2022-10-29 NOTE — DISCHARGE SUMMARY
Physician Discharge Summary     Patient ID:    Martell Wynn  [id number]  75 y.o.  1955    Admit date: 10/28/2022    Discharge date : 10/29/2022    Chronic Diagnoses:    Problem List as of 10/29/2022 Date Reviewed: 10/28/2022            Codes Class Noted - Resolved    Chest pain ICD-10-CM: R07.9  ICD-9-CM: 786.50  10/28/2022 - Present       22    Final Diagnoses:   Chest pain [R07.9] likely GI etiology  GERD      Reason for Hospitalization:  Patient is a 58-year-old female with a history of GERD and gastric sleeve surgery for obesity but no other medical problems who presented to the ED on the evening of 10/28 with chest tightness intermittently times several days, along with shortness of breath. WILSON score was only 1. She was admitted for further evaluation and cardiology consultation      Hospital Course:   Patient was admitted overnight as observation    Serial troponins were normal    She was seen by cardiology who felt patient to be discharged with outpatient nuclear stress test in the office next week              Discharge Medications:   Current Discharge Medication List        START taking these medications    Details   aspirin delayed-release 81 mg tablet Take 1 Tablet by mouth daily. Qty: 30 Tablet, Refills: 0  Start date: 10/30/2022               Follow up Care:    1. QUIQUE Pacheco in 1-2 weeks. Please call to set up an appointment shortly after discharge. Diet:  Cardiac Diet    Disposition:  Home. Advanced Directive:   FULL    DNR      Discharge Exam:  General:  Alert, cooperative, no distress, appears stated age. Lungs:   Clear to auscultation bilaterally. Chest wall:  No tenderness or deformity. Heart:  Regular rate and rhythm, S1, S2 normal, no murmur, click, rub or gallop. Abdomen:   Soft, non-tender. Bowel sounds normal. No masses,  No organomegaly. Extremities: Extremities normal, atraumatic, no cyanosis or edema.    Pulses: 2+ and symmetric all extremities. Skin: Skin color, texture, turgor normal. No rashes or lesions   Neurologic: CNII-XII intact. No gross sensory or motor deficits        CONSULTATIONS: Cardiology    Significant Diagnostic Studies:   10/28/2022: BUN 16 mg/dL (Ref range: 6 - 20 mg/dL); Calcium 8.7 mg/dL (Ref range: 8.5 - 10.1 mg/dL); CO2 27 mmol/L (Ref range: 21 - 32 mmol/L); Creatinine 0.97 mg/dL (Ref range: 0.55 - 1.02 mg/dL); Glucose 111 mg/dL (H; Ref range: 65 - 100 mg/dL); HCT 32.9 % (L; Ref range: 35.0 - 47.0 %); HGB 10.1 g/dL (L; Ref range: 11.5 - 16.0 g/dL); Potassium 4.1 mmol/L (Ref range: 3.5 - 5.1 mmol/L); Sodium 141 mmol/L (Ref range: 136 - 145 mmol/L)  10/29/2022: BUN 15 mg/dL (Ref range: 6 - 20 mg/dL); Calcium 8.4 mg/dL (L; Ref range: 8.5 - 10.1 mg/dL); CO2 27 mmol/L (Ref range: 21 - 32 mmol/L); Creatinine 0.92 mg/dL (Ref range: 0.55 - 1.02 mg/dL); Glucose 104 mg/dL (H; Ref range: 65 - 100 mg/dL); Potassium 4.2 mmol/L (Ref range: 3.5 - 5.1 mmol/L); Sodium 142 mmol/L (Ref range: 136 - 145 mmol/L)  Recent Labs     10/28/22  2145   WBC 6.6   HGB 10.1*   HCT 32.9*        Recent Labs     10/29/22  0030 10/28/22  2145    141   K 4.2 4.1   * 110*   CO2 27 27   BUN 15 16   CREA 0.92 0.97   * 111*   CA 8.4* 8.7     Recent Labs     10/28/22  2145   ALT 13   AP 74   TBILI 0.2   TP 7.4   ALB 2.9*   GLOB 4.5*     No results for input(s): INR, PTP, APTT, INREXT in the last 72 hours. No results for input(s): FE, TIBC, PSAT, FERR in the last 72 hours. No results for input(s): PH, PCO2, PO2 in the last 72 hours.   Recent Labs     10/29/22  0030   CPK 59     No results found for: [facility]    Discharge time spent 25 minutes    Signed:  Luis Hancock MD  10/29/2022  11:56 AM

## 2022-12-15 ENCOUNTER — HOSPITAL ENCOUNTER (OUTPATIENT)
Age: 67
Discharge: HOME | End: 2022-12-15
Attending: INTERNAL MEDICINE | Admitting: INTERNAL MEDICINE
Payer: MEDICARE

## 2022-12-15 VITALS
WEIGHT: 240 LBS | HEART RATE: 74 BPM | SYSTOLIC BLOOD PRESSURE: 158 MMHG | OXYGEN SATURATION: 100 % | DIASTOLIC BLOOD PRESSURE: 85 MMHG | BODY MASS INDEX: 47.12 KG/M2 | RESPIRATION RATE: 18 BRPM | HEIGHT: 60 IN | TEMPERATURE: 98.4 F

## 2022-12-15 DIAGNOSIS — R07.9 CHEST PAIN, UNSPECIFIED TYPE: ICD-10-CM

## 2022-12-15 LAB
ALBUMIN SERPL-MCNC: 3.4 G/DL (ref 3.5–5)
ALBUMIN/GLOB SERPL: 0.8 {RATIO} (ref 1.1–2.2)
ALP SERPL-CCNC: 83 U/L (ref 45–117)
ALT SERPL-CCNC: 14 U/L (ref 12–78)
ANION GAP SERPL CALC-SCNC: 3 MMOL/L (ref 5–15)
APTT PPP: 31.4 SEC (ref 21.2–34.1)
AST SERPL W P-5'-P-CCNC: 9 U/L (ref 15–37)
ATRIAL RATE: 61 BPM
BILIRUB SERPL-MCNC: 0.3 MG/DL (ref 0.2–1)
BUN SERPL-MCNC: 13 MG/DL (ref 6–20)
BUN/CREAT SERPL: 14 (ref 12–20)
CA-I BLD-MCNC: 9 MG/DL (ref 8.5–10.1)
CALCULATED P AXIS, ECG09: 66 DEGREES
CALCULATED R AXIS, ECG10: 1 DEGREES
CALCULATED T AXIS, ECG11: 15 DEGREES
CHLORIDE SERPL-SCNC: 107 MMOL/L (ref 97–108)
CO2 SERPL-SCNC: 29 MMOL/L (ref 21–32)
CREAT SERPL-MCNC: 0.92 MG/DL (ref 0.55–1.02)
DIAGNOSIS, 93000: NORMAL
ERYTHROCYTE [DISTWIDTH] IN BLOOD BY AUTOMATED COUNT: 14.1 % (ref 11.5–14.5)
GLOBULIN SER CALC-MCNC: 4.1 G/DL (ref 2–4)
GLUCOSE SERPL-MCNC: 95 MG/DL (ref 65–100)
HCT VFR BLD AUTO: 35 % (ref 35–47)
HGB BLD-MCNC: 10.8 G/DL (ref 11.5–16)
INR PPP: 1 (ref 0.9–1.1)
MCH RBC QN AUTO: 26.8 PG (ref 26–34)
MCHC RBC AUTO-ENTMCNC: 30.9 G/DL (ref 30–36.5)
MCV RBC AUTO: 86.8 FL (ref 80–99)
NRBC # BLD: 0 K/UL (ref 0–0.01)
NRBC BLD-RTO: 0 PER 100 WBC
P-R INTERVAL, ECG05: 182 MS
PLATELET # BLD AUTO: 337 K/UL (ref 150–400)
PMV BLD AUTO: 8.9 FL (ref 8.9–12.9)
POTASSIUM SERPL-SCNC: 4 MMOL/L (ref 3.5–5.1)
PROT SERPL-MCNC: 7.5 G/DL (ref 6.4–8.2)
PROTHROMBIN TIME: 13.3 SEC (ref 11.9–14.6)
Q-T INTERVAL, ECG07: 410 MS
QRS DURATION, ECG06: 92 MS
QTC CALCULATION (BEZET), ECG08: 412 MS
RBC # BLD AUTO: 4.03 M/UL (ref 3.8–5.2)
SODIUM SERPL-SCNC: 139 MMOL/L (ref 136–145)
THERAPEUTIC RANGE,PTTT: NORMAL SEC (ref 82–109)
VENTRICULAR RATE, ECG03: 61 BPM
WBC # BLD AUTO: 6.1 K/UL (ref 3.6–11)

## 2022-12-15 PROCEDURE — 85730 THROMBOPLASTIN TIME PARTIAL: CPT

## 2022-12-15 PROCEDURE — 74011250636 HC RX REV CODE- 250/636: Performed by: INTERNAL MEDICINE

## 2022-12-15 PROCEDURE — 74011000250 HC RX REV CODE- 250: Performed by: INTERNAL MEDICINE

## 2022-12-15 PROCEDURE — 80053 COMPREHEN METABOLIC PANEL: CPT

## 2022-12-15 PROCEDURE — 85027 COMPLETE CBC AUTOMATED: CPT

## 2022-12-15 PROCEDURE — 36415 COLL VENOUS BLD VENIPUNCTURE: CPT

## 2022-12-15 PROCEDURE — 85610 PROTHROMBIN TIME: CPT

## 2022-12-15 PROCEDURE — 93005 ELECTROCARDIOGRAM TRACING: CPT

## 2022-12-15 PROCEDURE — 93010 ELECTROCARDIOGRAM REPORT: CPT | Performed by: INTERNAL MEDICINE

## 2022-12-15 RX ORDER — NICARDIPINE HYDROCHLORIDE 2.5 MG/ML
INJECTION INTRAVENOUS AS NEEDED
Status: DISCONTINUED | OUTPATIENT
Start: 2022-12-15 | End: 2022-12-15 | Stop reason: HOSPADM

## 2022-12-15 RX ORDER — HYDROCODONE BITARTRATE AND ACETAMINOPHEN 5; 325 MG/1; MG/1
1 TABLET ORAL
Status: DISCONTINUED | OUTPATIENT
Start: 2022-12-15 | End: 2022-12-15 | Stop reason: HOSPADM

## 2022-12-15 RX ORDER — SODIUM CHLORIDE 9 MG/ML
50 INJECTION, SOLUTION INTRAVENOUS CONTINUOUS
Status: DISCONTINUED | OUTPATIENT
Start: 2022-12-15 | End: 2022-12-15 | Stop reason: HOSPADM

## 2022-12-15 RX ORDER — SODIUM CHLORIDE 0.9 % (FLUSH) 0.9 %
5-40 SYRINGE (ML) INJECTION EVERY 8 HOURS
Status: DISCONTINUED | OUTPATIENT
Start: 2022-12-15 | End: 2022-12-15 | Stop reason: HOSPADM

## 2022-12-15 RX ORDER — FENTANYL CITRATE 50 UG/ML
INJECTION, SOLUTION INTRAMUSCULAR; INTRAVENOUS AS NEEDED
Status: DISCONTINUED | OUTPATIENT
Start: 2022-12-15 | End: 2022-12-15 | Stop reason: HOSPADM

## 2022-12-15 RX ORDER — MIDAZOLAM HYDROCHLORIDE 1 MG/ML
INJECTION INTRAMUSCULAR; INTRAVENOUS AS NEEDED
Status: DISCONTINUED | OUTPATIENT
Start: 2022-12-15 | End: 2022-12-15 | Stop reason: HOSPADM

## 2022-12-15 RX ORDER — LIDOCAINE HYDROCHLORIDE 10 MG/ML
INJECTION INFILTRATION; PERINEURAL AS NEEDED
Status: DISCONTINUED | OUTPATIENT
Start: 2022-12-15 | End: 2022-12-15 | Stop reason: HOSPADM

## 2022-12-15 RX ORDER — DIPHENHYDRAMINE HYDROCHLORIDE 50 MG/ML
INJECTION, SOLUTION INTRAMUSCULAR; INTRAVENOUS AS NEEDED
Status: DISCONTINUED | OUTPATIENT
Start: 2022-12-15 | End: 2022-12-15 | Stop reason: HOSPADM

## 2022-12-15 RX ORDER — ACETAMINOPHEN 325 MG/1
650 TABLET ORAL
Status: DISCONTINUED | OUTPATIENT
Start: 2022-12-15 | End: 2022-12-15 | Stop reason: HOSPADM

## 2022-12-15 RX ORDER — SODIUM CHLORIDE 0.9 % (FLUSH) 0.9 %
5-40 SYRINGE (ML) INJECTION AS NEEDED
Status: DISCONTINUED | OUTPATIENT
Start: 2022-12-15 | End: 2022-12-15 | Stop reason: HOSPADM

## 2022-12-15 RX ORDER — HEPARIN SODIUM 1000 [USP'U]/ML
INJECTION, SOLUTION INTRAVENOUS; SUBCUTANEOUS AS NEEDED
Status: DISCONTINUED | OUTPATIENT
Start: 2022-12-15 | End: 2022-12-15 | Stop reason: HOSPADM

## 2022-12-15 RX ADMIN — SODIUM CHLORIDE 50 ML/HR: 9 INJECTION, SOLUTION INTRAVENOUS at 10:18

## 2022-12-15 NOTE — Clinical Note
Contrast Dose Calculator:   Patient's age: 75.   Patient's sex: Female. Patient weight (kg) = 108.9. Creatinine level (mg/dL) = 0.92. Creatinine clearance (mL/min): 101. Contrast concentration (mg/mL) = 370. MACD = 300 mL. Max Contrast dose per Creatinine Cl calculator = 227.25 mL.

## 2022-12-15 NOTE — PERIOP NOTES
Patient states okay to review and give discharge instructions to [facility] - [facility]- spouse

## 2022-12-15 NOTE — DISCHARGE INSTRUCTIONS
RADIAL CATHETERIZATION AND INTERVENTIONAL DISCHARGE INSTRUCTIONS       MEDICATIONS:  Take only medications ordered by your provider. ACTIVITIES:  While the wound is healing; bleeding or swelling can occur as a result of stress or strain. Carefully follow these guidelines:    DO NOT DRIVE for 24 hours after the procedure or as instructed by your provider. You may shower 24 hours after your procedure. No soaking the wrist for 3 days (i.e., bath tub, swimming, washing dishes). You may return to work in (***) days. It is essential that you DO NOT SMOKE. Do not bend your wrist for 24 hours. Do not lift more than 3-5 pounds with affected wrist for 1 week. SITE CARE:  Use a clean Band-Aid® for 48 hours. Keep site clean and dry. Avoid lotions, ointments or powders at the wound site for I week. Check the procedural site for any signs of infection (redness, drainage, swelling). You may notice a small, hard lump or small bruise at the puncture site. This is normal and will clear in about 2 weeks. If the lump increases in size; apply firm, direct pressure over the site. Notify your physician. If there is a small amount of bleeding at the site; apply firm, direct continuous pressure over the site with your thumb against the puncture site and your finger against the back of the wrist for 10 minutes. Your nurse will review this with you. Notify your physician. If bleeding does not stop after 10 minutes or if there is a large amount of bleeding, call for an ambulance immediately. Continue to hold pressure until help arrives. WHEN TO CALL YOUR DOCTOR:  Angina - if your angina symptoms return; use your nitroglycerin as instructed by your provider. If you do not have nitroglycerin or if your symptoms do not completely resolve after 10 -15 minutes, call an ambulance.  Do not drive yourself to the hospital.  Warmth, redness, drainage and/or pain at the procedural site or temperature over 101°F.  Persistent tenderness or pain at procedural site. Swelling, coolness, numbness and /or tingling of the fingers, hand, wrist or arm. Lightheadedness, dizziness, fainting or rapid heart beating. If you have any other questions or concerns, or you are experiencing a problem. FOLLOW-UP CARE:  Follow up with your provider and /or cardiologist as recommended. If you had a Stent implanted, carry your Stent ID card with you at all times. Never stop taking your prescribed Brilinta (Ticagrelor),Plavix (Clopidogrel), Aspirin, Coumadin (Warfarin) or Effient (Prasugrel) without speaking with your cardiologist.     I understand and can verbalize these instructions, and have participated in the development of this discharge plan. I have read and received a copy of this plan. [facility]  Cardiac Catheterization Department  [address] [place] [address], [address] [place] [address]  [phone]        Coronary Angiogram: What to Expect at [address] [place] [address]  A coronary angiogram is a test to examine the large blood vessels of your heart (coronary arteries). The doctor inserted a thin, flexible tube (catheter into a blood vessel in your groin or wrist.  Your groin or wrist may have a bruise and feel sore for a few days after the procedure. You can do light activities around the house. But do not do anything strenuous until your doctor says it is ok. This may be for several days. This care sheet gives you a general idea about how long it will take for you to recover. But each person recovers at a different pace. Follow the steps below to feel better as quickly as possible. How can you care for yourself at home? Activity  If the doctor gave you a sedative: For 24 hours, don't do anything that requires attention to detail, such as going to work, making important decisions, or signing any legal documents. It takes time for the medicine's effects to completely wear off.   For your safety, do not drive or operate any machinery that could be dangerous. Wait until the medicine wears off and you can think clearly and react easily. Do not do any strenuous exercise and do not lift, pull, or push anything heavier than 5 pounds (approximately the weight of 1 gallon of milk) until your doctor says it is ok. This may be for several days. You can walk around the house and do light activity, such as cooking. If the catheter was placed in your groin and you must use stairs, just go up and down slowly in as few trips as possible for the first couple of days. If the catheter was placed in your wrist, do not bend your wrist deeply for the first couple of days. A soft wrist-splint may be placed on your wrist as a reminder following the procedure. Be careful using your hand to get into and out of a chair or bed and when opening doors. Get regular exercise, after being cleared by your cardiologist.  Walking may be a good choice. Try for at least 30 minutes on most days of the week. If you smoke or use smokeless tobacco products, including vaping products, it is extremely important that you quit using these products. Please ask you nurse or doctor for more information about smoking cessation. Diet  Drink plenty of fluids to help you body flush out the dye. If you have kidney, heart, or liver disease and have to limit fluids, talk to your doctor before increasing the amount of fluids you drink. Keep eating a heart-healthy diet that has lots of fruits, vegetables, and whole grains. If you have not been eating this way, talk to your doctor. You also may want to talk to a dietician. This expert can help you to learn about healthy foods and plan meals. Medicines  Your doctor will tell you if and when you can restart your medicines. You will also be given instructions about taking any new medicines. Take these medicines as prescribed.   Continue taking your cholesterol lowering medications (statins), if you have been prescribed this. You doctor may prescribe a blood-thinning medicine like aspirin or clopidogrel (Plavix), plasugrel (Effient), or ticagrelor (Brilinta). If you had angioplasty or a stent placement, you must continue aspirin and Plavix, Effient, or Brilinta. It is very important that you take these medicines exactly as directed to keep the coronary artery open and reduce your risk of heart attack. Be safe with medicines. Call your doctor if you think you are having a problem with taking or obtaining your medicines, notify your doctor immediately. You cannot afford to miss your medications. Do not stop taking these medications without speaking to your cardiologist first.   Do not strain to have a bowel movement. Ask your doctor if you feel you may need a stool softener of laxative. Care of the catheter site  For 1 or 2 days, keep a bandage over the spot where the catheter(s) was inserted. The bandage probably will fall off in this time. Put ice or a cold pack on the area for 10 to 20 minutes at a time to help with soreness of swelling. Place a thin cloth between the ice and your skin. You may shower 24 to 48 hours after the procedure, if your doctor says it is okay. Pat the incision dry with a clean towel afterwards. Do not soak the catheter site until it is healed. Don't take a bath for 1 week, or until your doctor tells you it is okay to do so. The use of lotions and powders is not recommended at the site until it is completely healed. Watch for bleeding from the site. A small amount of blood (up to the size of a quarter) on the bandage can be normal.  If you cough, sneeze, or laugh vigorously, apply direct pressure with your hand over the catheter site. If you notice a little bit of blood from the catheter site (similar to a paper cut or shaving nick), lie down and press firmly on the area for 15 minutes to try and make it stop bleeding.   If the bleeding does not stop, call your doctor or seek immediate medical care. If you notice heavy bleeding at the site that has either saturated the bandage or is squirting, lie down, press firmly on the site, and have someone call 911. Follow-up care is a key part of your treatment and safety. Be sure to make and go to all appointments and call your doctor if you are having problems. It's also a good idea to know your test results and keep a list of medicines you take. When should you call for help? Call 911 anytime you think you may need emergency care. For example, call if:  You passed out (lost consciousness). You have severe trouble breathing. You have sudden chest pain and shortness of breath, or you cough up blood. Call 911 if you have symptoms of a heart attack, such as:  Chest Pain, pressure, squeezing, or burning. Sweating. Shortness of breath or difficulty breathing. Nausea or vomiting. Jaw pain, shoulder pain, or arm pain in one or both sides. Feelings of fullness. Excessive fatigue or weakness. New onset anxiety. New onset of upper back pain. Dizziness or lightheadedness. A fast or uneven pulse. Call 911 if you have been diagnosed with angina, and you have symptoms that do not go away with rest or are not getting better within 5 minutes of taking a dose of your nitroglycerin. After you call 911, the  may tell you to chew 1 adult-strength (325 mg) of 2 to 4 low-dose aspirin (81 mg). Wait for ambulance. Do not drive yourself. Call your doctor now or seek immediate medical care if:  You are bleeding from the area where the catheter was inserted. You have a fast-growing, painful lump at the catheter site. You have signs of infection, such as  Increased pain, swelling, warmth or redness at the catheter site. Red streaks leading from the catheter site. Pus draining from the catheter site. A fever. Your leg or hand is painful, looks blue, or feels cold, numb, or tingly.      Watch closely for changes in your health and be sure to contact your doctor if you have any problems.

## 2022-12-15 NOTE — PERIOP NOTES
TRANSFER - OUT REPORT:    Verbal report given to COOKIE Johnson(name) on Inés Akhtar  being transferred to [facility] 20(unit) for routine post - op       Report consisted of patients Situation, Background, Assessment and   Recommendations(SBAR). Information from the following report(s) SBAR, Procedure Summary, Intake/Output, and MAR was reviewed with the receiving nurse. Opportunity for questions and clarification was provided.       Patient transported with:   Registered Nurse

## 2022-12-15 NOTE — Clinical Note
TRANSFER - OUT REPORT:     Verbal report given to: Urszula Garcia. Report consisted of patient's Situation, Background, Assessment and   Recommendations(SBAR). Opportunity for questions and clarification was provided. Patient transported with a Registered Nurse. Patient transported to: PACU.

## 2023-05-25 RX ORDER — ASPIRIN 81 MG/1
81 TABLET ORAL DAILY
COMMUNITY
Start: 2022-10-30

## 2023-11-09 ENCOUNTER — HOSPITAL ENCOUNTER (OUTPATIENT)
Facility: HOSPITAL | Age: 68
Discharge: HOME | End: 2023-11-09
Attending: INTERNAL MEDICINE | Admitting: INTERNAL MEDICINE
Payer: MEDICARE

## 2023-11-09 VITALS
RESPIRATION RATE: 18 BRPM | TEMPERATURE: 97.7 F | HEIGHT: 60 IN | HEART RATE: 74 BPM | WEIGHT: 240.52 LBS | OXYGEN SATURATION: 98 % | DIASTOLIC BLOOD PRESSURE: 74 MMHG | SYSTOLIC BLOOD PRESSURE: 134 MMHG | BODY MASS INDEX: 47.22 KG/M2

## 2023-11-09 DIAGNOSIS — R07.9 CHEST PAIN, UNSPECIFIED TYPE: ICD-10-CM

## 2023-11-09 LAB
ALBUMIN SERPL-MCNC: 3.3 G/DL (ref 3.5–5)
ALBUMIN/GLOB SERPL: 0.8 (ref 1.1–2.2)
ALP SERPL-CCNC: 78 U/L (ref 45–117)
ALT SERPL-CCNC: 12 U/L (ref 12–78)
ANION GAP SERPL CALC-SCNC: 3 MMOL/L (ref 5–15)
APTT PPP: 29.6 SEC (ref 21.2–34.1)
AST SERPL W P-5'-P-CCNC: 7 U/L (ref 15–37)
BILIRUB SERPL-MCNC: 0.2 MG/DL (ref 0.2–1)
BUN SERPL-MCNC: 20 MG/DL (ref 6–20)
BUN/CREAT SERPL: 20 (ref 12–20)
CA-I BLD-MCNC: 9 MG/DL (ref 8.5–10.1)
CHLORIDE SERPL-SCNC: 106 MMOL/L (ref 97–108)
CO2 SERPL-SCNC: 29 MMOL/L (ref 21–32)
CREAT SERPL-MCNC: 1 MG/DL (ref 0.55–1.02)
ECHO BSA: 2.15 M2
ERYTHROCYTE [DISTWIDTH] IN BLOOD BY AUTOMATED COUNT: 14 % (ref 11.5–14.5)
GLOBULIN SER CALC-MCNC: 4.2 G/DL (ref 2–4)
GLUCOSE SERPL-MCNC: 93 MG/DL (ref 65–100)
HCT VFR BLD AUTO: 34.6 % (ref 35–47)
HGB BLD-MCNC: 10.8 G/DL (ref 11.5–16)
INR PPP: 0.9 (ref 0.9–1.1)
MCH RBC QN AUTO: 26.7 PG (ref 26–34)
MCHC RBC AUTO-ENTMCNC: 31.2 G/DL (ref 30–36.5)
MCV RBC AUTO: 85.4 FL (ref 80–99)
NRBC # BLD: 0 K/UL (ref 0–0.01)
NRBC BLD-RTO: 0 PER 100 WBC
PLATELET # BLD AUTO: 342 K/UL (ref 150–400)
PMV BLD AUTO: 9.1 FL (ref 8.9–12.9)
POTASSIUM SERPL-SCNC: 4 MMOL/L (ref 3.5–5.1)
PROT SERPL-MCNC: 7.5 G/DL (ref 6.4–8.2)
PROTHROMBIN TIME: 12.4 SEC (ref 11.9–14.6)
RBC # BLD AUTO: 4.05 M/UL (ref 3.8–5.2)
SODIUM SERPL-SCNC: 138 MMOL/L (ref 136–145)
THERAPEUTIC RANGE: NORMAL SEC (ref 82–109)
WBC # BLD AUTO: 6.5 K/UL (ref 3.6–11)

## 2023-11-09 PROCEDURE — 6360000002 HC RX W HCPCS: Performed by: STUDENT IN AN ORGANIZED HEALTH CARE EDUCATION/TRAINING PROGRAM

## 2023-11-09 PROCEDURE — 7100000011 HC PHASE II RECOVERY - ADDTL 15 MIN: Performed by: STUDENT IN AN ORGANIZED HEALTH CARE EDUCATION/TRAINING PROGRAM

## 2023-11-09 PROCEDURE — 2500000003 HC RX 250 WO HCPCS: Performed by: STUDENT IN AN ORGANIZED HEALTH CARE EDUCATION/TRAINING PROGRAM

## 2023-11-09 PROCEDURE — 85730 THROMBOPLASTIN TIME PARTIAL: CPT

## 2023-11-09 PROCEDURE — 85610 PROTHROMBIN TIME: CPT

## 2023-11-09 PROCEDURE — C1894 INTRO/SHEATH, NON-LASER: HCPCS | Performed by: STUDENT IN AN ORGANIZED HEALTH CARE EDUCATION/TRAINING PROGRAM

## 2023-11-09 PROCEDURE — C1769 GUIDE WIRE: HCPCS | Performed by: STUDENT IN AN ORGANIZED HEALTH CARE EDUCATION/TRAINING PROGRAM

## 2023-11-09 PROCEDURE — 7100000000 HC PACU RECOVERY - FIRST 15 MIN: Performed by: STUDENT IN AN ORGANIZED HEALTH CARE EDUCATION/TRAINING PROGRAM

## 2023-11-09 PROCEDURE — 93451 RIGHT HEART CATH: CPT | Performed by: STUDENT IN AN ORGANIZED HEALTH CARE EDUCATION/TRAINING PROGRAM

## 2023-11-09 PROCEDURE — 93005 ELECTROCARDIOGRAM TRACING: CPT | Performed by: INTERNAL MEDICINE

## 2023-11-09 PROCEDURE — 80053 COMPREHEN METABOLIC PANEL: CPT

## 2023-11-09 PROCEDURE — 2709999900 HC NON-CHARGEABLE SUPPLY: Performed by: STUDENT IN AN ORGANIZED HEALTH CARE EDUCATION/TRAINING PROGRAM

## 2023-11-09 PROCEDURE — C1760 CLOSURE DEV, VASC: HCPCS | Performed by: STUDENT IN AN ORGANIZED HEALTH CARE EDUCATION/TRAINING PROGRAM

## 2023-11-09 PROCEDURE — C1887 CATHETER, GUIDING: HCPCS | Performed by: STUDENT IN AN ORGANIZED HEALTH CARE EDUCATION/TRAINING PROGRAM

## 2023-11-09 PROCEDURE — 7100000001 HC PACU RECOVERY - ADDTL 15 MIN: Performed by: STUDENT IN AN ORGANIZED HEALTH CARE EDUCATION/TRAINING PROGRAM

## 2023-11-09 PROCEDURE — 36415 COLL VENOUS BLD VENIPUNCTURE: CPT

## 2023-11-09 PROCEDURE — 7100000010 HC PHASE II RECOVERY - FIRST 15 MIN: Performed by: STUDENT IN AN ORGANIZED HEALTH CARE EDUCATION/TRAINING PROGRAM

## 2023-11-09 PROCEDURE — 85027 COMPLETE CBC AUTOMATED: CPT

## 2023-11-09 RX ORDER — ACETAMINOPHEN 325 MG/1
650 TABLET ORAL EVERY 4 HOURS PRN
Status: DISCONTINUED | OUTPATIENT
Start: 2023-11-09 | End: 2023-11-09 | Stop reason: HOSPADM

## 2023-11-09 RX ORDER — LISINOPRIL 10 MG/1
10 TABLET ORAL DAILY
COMMUNITY
Start: 2023-10-03

## 2023-11-09 RX ORDER — SODIUM CHLORIDE 9 MG/ML
INJECTION, SOLUTION INTRAVENOUS CONTINUOUS
Status: DISCONTINUED | OUTPATIENT
Start: 2023-11-09 | End: 2023-11-09 | Stop reason: HOSPADM

## 2023-11-09 RX ORDER — LIDOCAINE HYDROCHLORIDE 10 MG/ML
INJECTION, SOLUTION INFILTRATION; PERINEURAL PRN
Status: DISCONTINUED | OUTPATIENT
Start: 2023-11-09 | End: 2023-11-09 | Stop reason: HOSPADM

## 2023-11-09 RX ORDER — SODIUM CHLORIDE 0.9 % (FLUSH) 0.9 %
5-40 SYRINGE (ML) INJECTION EVERY 12 HOURS SCHEDULED
Status: DISCONTINUED | OUTPATIENT
Start: 2023-11-09 | End: 2023-11-09 | Stop reason: HOSPADM

## 2023-11-09 RX ORDER — SODIUM CHLORIDE 9 MG/ML
INJECTION, SOLUTION INTRAVENOUS PRN
Status: DISCONTINUED | OUTPATIENT
Start: 2023-11-09 | End: 2023-11-09 | Stop reason: HOSPADM

## 2023-11-09 RX ORDER — HEPARIN SODIUM 200 [USP'U]/100ML
INJECTION, SOLUTION INTRAVENOUS CONTINUOUS PRN
Status: COMPLETED | OUTPATIENT
Start: 2023-11-09 | End: 2023-11-09

## 2023-11-09 RX ORDER — SODIUM CHLORIDE 0.9 % (FLUSH) 0.9 %
5-40 SYRINGE (ML) INJECTION PRN
Status: DISCONTINUED | OUTPATIENT
Start: 2023-11-09 | End: 2023-11-09 | Stop reason: HOSPADM

## 2023-11-09 ASSESSMENT — PAIN - FUNCTIONAL ASSESSMENT: PAIN_FUNCTIONAL_ASSESSMENT: 0-10

## 2023-11-09 ASSESSMENT — PAIN SCALES - GENERAL
PAINLEVEL_OUTOF10: 0
PAINLEVEL_OUTOF10: 0

## 2023-11-09 NOTE — PROGRESS NOTES
Discharge instructions reviewed with patient and . Patient has no further questions. IV removed; cath tip intact. Patient to be wheeled downstairs via wheelchair. Patient to be transported home by .

## 2023-11-09 NOTE — PROGRESS NOTES
Patient arrived to [facility]. Patient is alert with no distress. VS stable. Family at bedside. Will continue to monitor.

## 2023-11-09 NOTE — PROGRESS NOTES
Patient states okay to review and give discharge instructions to  [facility] - mayra SIERRA

## 2023-11-10 LAB
EKG ATRIAL RATE: 55 BPM
EKG DIAGNOSIS: NORMAL
EKG P AXIS: 55 DEGREES
EKG P-R INTERVAL: 186 MS
EKG Q-T INTERVAL: 424 MS
EKG QRS DURATION: 84 MS
EKG QTC CALCULATION (BAZETT): 405 MS
EKG R AXIS: 3 DEGREES
EKG T AXIS: 18 DEGREES
EKG VENTRICULAR RATE: 55 BPM

## 2023-11-19 LAB — ECHO BSA: 2.15 M2

## 2023-11-21 ENCOUNTER — HOSPITAL ENCOUNTER (EMERGENCY)
Facility: HOSPITAL | Age: 68
Discharge: HOME | End: 2023-11-22
Payer: MEDICARE

## 2023-11-21 DIAGNOSIS — R11.2 NAUSEA AND VOMITING, UNSPECIFIED VOMITING TYPE: Primary | ICD-10-CM

## 2023-11-21 PROCEDURE — 99284 EMERGENCY DEPT VISIT MOD MDM: CPT

## 2023-11-21 ASSESSMENT — LIFESTYLE VARIABLES
HOW OFTEN DO YOU HAVE A DRINK CONTAINING ALCOHOL: NEVER
HOW MANY STANDARD DRINKS CONTAINING ALCOHOL DO YOU HAVE ON A TYPICAL DAY: PATIENT DOES NOT DRINK

## 2023-11-21 ASSESSMENT — PAIN SCALES - GENERAL: PAINLEVEL_OUTOF10: 3

## 2023-11-21 ASSESSMENT — PAIN - FUNCTIONAL ASSESSMENT: PAIN_FUNCTIONAL_ASSESSMENT: 0-10

## 2023-11-22 VITALS
WEIGHT: 250 LBS | OXYGEN SATURATION: 100 % | DIASTOLIC BLOOD PRESSURE: 77 MMHG | BODY MASS INDEX: 49.08 KG/M2 | RESPIRATION RATE: 18 BRPM | HEIGHT: 60 IN | TEMPERATURE: 98.3 F | HEART RATE: 78 BPM | SYSTOLIC BLOOD PRESSURE: 133 MMHG

## 2023-11-22 LAB
ALBUMIN SERPL-MCNC: 3.2 G/DL (ref 3.5–5)
ALBUMIN/GLOB SERPL: 0.7 (ref 1.1–2.2)
ALP SERPL-CCNC: 81 U/L (ref 45–117)
ALT SERPL-CCNC: 12 U/L (ref 12–78)
ANION GAP SERPL CALC-SCNC: 6 MMOL/L (ref 5–15)
AST SERPL W P-5'-P-CCNC: 9 U/L (ref 15–37)
BASOPHILS # BLD: 0 K/UL (ref 0–0.1)
BASOPHILS NFR BLD: 1 % (ref 0–1)
BILIRUB SERPL-MCNC: 0.2 MG/DL (ref 0.2–1)
BUN SERPL-MCNC: 19 MG/DL (ref 6–20)
BUN/CREAT SERPL: 19 (ref 12–20)
CA-I BLD-MCNC: 8.5 MG/DL (ref 8.5–10.1)
CHLORIDE SERPL-SCNC: 112 MMOL/L (ref 97–108)
CO2 SERPL-SCNC: 26 MMOL/L (ref 21–32)
CREAT SERPL-MCNC: 1 MG/DL (ref 0.55–1.02)
DIFFERENTIAL METHOD BLD: ABNORMAL
EOSINOPHIL # BLD: 0.1 K/UL (ref 0–0.4)
EOSINOPHIL NFR BLD: 2 % (ref 0–7)
ERYTHROCYTE [DISTWIDTH] IN BLOOD BY AUTOMATED COUNT: 14.5 % (ref 11.5–14.5)
GLOBULIN SER CALC-MCNC: 4.3 G/DL (ref 2–4)
GLUCOSE SERPL-MCNC: 114 MG/DL (ref 65–100)
HCT VFR BLD AUTO: 33.1 % (ref 35–47)
HGB BLD-MCNC: 10.4 G/DL (ref 11.5–16)
IMM GRANULOCYTES # BLD AUTO: 0 K/UL (ref 0–0.04)
IMM GRANULOCYTES NFR BLD AUTO: 0 % (ref 0–0.5)
LIPASE SERPL-CCNC: 19 U/L (ref 13–75)
LYMPHOCYTES # BLD: 1.9 K/UL (ref 0.8–3.5)
LYMPHOCYTES NFR BLD: 30 % (ref 12–49)
MAGNESIUM SERPL-MCNC: 2.2 MG/DL (ref 1.6–2.4)
MCH RBC QN AUTO: 26.7 PG (ref 26–34)
MCHC RBC AUTO-ENTMCNC: 31.4 G/DL (ref 30–36.5)
MCV RBC AUTO: 84.9 FL (ref 80–99)
MONOCYTES # BLD: 0.5 K/UL (ref 0–1)
MONOCYTES NFR BLD: 9 % (ref 5–13)
NEUTS SEG # BLD: 3.6 K/UL (ref 1.8–8)
NEUTS SEG NFR BLD: 58 % (ref 32–75)
NRBC # BLD: 0 K/UL (ref 0–0.01)
NRBC BLD-RTO: 0 PER 100 WBC
PLATELET # BLD AUTO: 336 K/UL (ref 150–400)
PMV BLD AUTO: 9.3 FL (ref 8.9–12.9)
POTASSIUM SERPL-SCNC: 3.9 MMOL/L (ref 3.5–5.1)
PROT SERPL-MCNC: 7.5 G/DL (ref 6.4–8.2)
RBC # BLD AUTO: 3.9 M/UL (ref 3.8–5.2)
SODIUM SERPL-SCNC: 144 MMOL/L (ref 136–145)
TROPONIN I SERPL HS-MCNC: 6 NG/L (ref 0–51)
WBC # BLD AUTO: 6.2 K/UL (ref 3.6–11)

## 2023-11-22 PROCEDURE — 85025 COMPLETE CBC W/AUTO DIFF WBC: CPT

## 2023-11-22 PROCEDURE — 80053 COMPREHEN METABOLIC PANEL: CPT

## 2023-11-22 PROCEDURE — 83690 ASSAY OF LIPASE: CPT

## 2023-11-22 PROCEDURE — 84484 ASSAY OF TROPONIN QUANT: CPT

## 2023-11-22 PROCEDURE — 83735 ASSAY OF MAGNESIUM: CPT

## 2023-11-22 ASSESSMENT — PAIN - FUNCTIONAL ASSESSMENT: PAIN_FUNCTIONAL_ASSESSMENT: NONE - DENIES PAIN

## 2023-11-22 NOTE — ED TRIAGE NOTES
Pt c/o nausea and vomiting that began a few hours ago, states that she was vomiting for approximately an hour straight. Pt also states that blood pressure was elevated at home.  Denies abd pain, chest pain, or SOB

## 2023-11-22 NOTE — DISCHARGE INSTRUCTIONS
Thank you! Thank you for allowing me to care for you in the emergency department. It is my goal to provide you with excellent care. If you have not received excellent quality care, please ask to speak to the nurse manager. Please fill out the survey that will come to you by mail or email since we listen to your feedback! Below you will find a list of your tests from today's visit. Should you have any questions, please do not hesitate to call the emergency department.     Labs  Recent Results (from the past 12 hour(s))   CBC with Auto Differential    Collection Time: 11/22/23 12:01 AM   Result Value Ref Range    WBC 6.2 3.6 - 11.0 K/uL    RBC 3.90 3.80 - 5.20 M/uL    Hemoglobin 10.4 (L) 11.5 - 16.0 g/dL    Hematocrit 33.1 (L) 35.0 - 47.0 %    MCV 84.9 80.0 - 99.0 FL    MCH 26.7 26.0 - 34.0 PG    MCHC 31.4 30.0 - 36.5 g/dL    RDW 14.5 11.5 - 14.5 %    Platelets 408 696 - 192 K/uL    MPV 9.3 8.9 - 12.9 FL    Nucleated RBCs 0.0 0.0  WBC    nRBC 0.00 0.00 - 0.01 K/uL    Neutrophils % 58 32 - 75 %    Lymphocytes % 30 12 - 49 %    Monocytes % 9 5 - 13 %    Eosinophils % 2 0 - 7 %    Basophils % 1 0 - 1 %    Immature Granulocytes 0 0 - 0.5 %    Neutrophils Absolute 3.6 1.8 - 8.0 K/UL    Lymphocytes Absolute 1.9 0.8 - 3.5 K/UL    Monocytes Absolute 0.5 0.0 - 1.0 K/UL    Eosinophils Absolute 0.1 0.0 - 0.4 K/UL    Basophils Absolute 0.0 0.0 - 0.1 K/UL    Absolute Immature Granulocyte 0.0 0.00 - 0.04 K/UL    Differential Type AUTOMATED     Comprehensive Metabolic Panel    Collection Time: 11/22/23 12:01 AM   Result Value Ref Range    Sodium 144 136 - 145 mmol/L    Potassium 3.9 3.5 - 5.1 mmol/L    Chloride 112 (H) 97 - 108 mmol/L    CO2 26 21 - 32 mmol/L    Anion Gap 6 5 - 15 mmol/L    Glucose 114 (H) 65 - 100 mg/dL    BUN 19 6 - 20 mg/dL    Creatinine 1.00 0.55 - 1.02 mg/dL    Bun/Cre Ratio 19 12 - 20      Est, Glom Filt Rate >60 >60 ml/min/1.73m2    Calcium 8.5 8.5 - 10.1 mg/dL    Total Bilirubin 0.2 0.2 -

## 2023-11-22 NOTE — ED PROVIDER NOTES
[facility] EMERGENCY DEPT  EMERGENCY DEPARTMENT HISTORY AND PHYSICAL EXAM      Date: 11/21/2023  Patient Name: Sharyn Hawkins  MRN: [id number]  [address] [facility] [address] 1955  Date of evaluation: 11/21/2023  Provider: ARISTIDES House NP   Note Started: 2:27 AM EST 11/22/23    HISTORY OF PRESENT ILLNESS     Chief Complaint   Patient presents with    Nausea    Emesis       History Provided By: Patient    HPI: Sharyn Hawkins is a 70 y.o. female with no significant past medical history presents to the emergency room with cc of vomiting. Patient reports sudden onset of vomiting around 9 PM tonight that lasted 30 minutes. She denies any associated abdominal pain diarrhea, urinary symptoms or fever/chills. She states she checked her blood pressure and it was noted to be elevated at 180/101 which concerned her. She states the vomiting episodes typically will happen related to her reflux; she did eat goulash today and canned spaghetti tonight though she states she has been compliant with her omeprazole. She states if it was not for elevated blood pressure she would not have even come to the hospital because her nausea and vomiting has subsided. She denies any associated CP or SOB and states she had a normal cardiac cath last week.     PAST MEDICAL HISTORY   Past Medical History:  Past Medical History:   Diagnosis Date    Obesity        Past Surgical History:  Past Surgical History:   Procedure Laterality Date    CARDIAC PROCEDURE N/A 11/9/2023    Right heart cath performed by Miguel Anderson MD at [facility] CATH LAB    CHOLECYSTECTOMY      GASTRECTOMY  2013    gastric sleeve    HYSTERECTOMY (CERVIX STATUS UNKNOWN)  1998    INVASIVE VASCULAR N/A 11/9/2023    Ultrasound guided vascular access performed by Miguel Anderson MD at [facility] CATH LAB       Family History:  Family History   Problem Relation Age of Onset    Cancer Mother     Diabetes Father     Heart Disease Father        Social History:  Social History

## 2023-11-25 LAB
EKG ATRIAL RATE: 87 BPM
EKG DIAGNOSIS: NORMAL
EKG P AXIS: 49 DEGREES
EKG P-R INTERVAL: 192 MS
EKG Q-T INTERVAL: 352 MS
EKG QRS DURATION: 84 MS
EKG QTC CALCULATION (BAZETT): 423 MS
EKG R AXIS: 1 DEGREES
EKG T AXIS: 13 DEGREES
EKG VENTRICULAR RATE: 87 BPM

## 2024-08-26 ENCOUNTER — APPOINTMENT (OUTPATIENT)
Facility: HOSPITAL | Age: 69
End: 2024-08-26
Payer: MEDICARE

## 2024-08-26 ENCOUNTER — HOSPITAL ENCOUNTER (EMERGENCY)
Facility: HOSPITAL | Age: 69
Discharge: HOME | End: 2024-08-26
Attending: STUDENT IN AN ORGANIZED HEALTH CARE EDUCATION/TRAINING PROGRAM
Payer: MEDICARE

## 2024-08-26 VITALS
TEMPERATURE: 98 F | BODY MASS INDEX: 50.26 KG/M2 | OXYGEN SATURATION: 99 % | HEART RATE: 75 BPM | HEIGHT: 60 IN | SYSTOLIC BLOOD PRESSURE: 171 MMHG | DIASTOLIC BLOOD PRESSURE: 79 MMHG | RESPIRATION RATE: 18 BRPM | WEIGHT: 256 LBS

## 2024-08-26 DIAGNOSIS — M25.512 ACUTE PAIN OF LEFT SHOULDER: Primary | ICD-10-CM

## 2024-08-26 LAB
ANION GAP SERPL CALC-SCNC: 7 MMOL/L (ref 5–15)
BASOPHILS # BLD: 0.1 K/UL (ref 0–0.1)
BASOPHILS NFR BLD: 1 % (ref 0–1)
BUN SERPL-MCNC: 17 MG/DL (ref 6–20)
BUN/CREAT SERPL: 19 (ref 12–20)
CA-I BLD-MCNC: 9.2 MG/DL (ref 8.5–10.1)
CHLORIDE SERPL-SCNC: 108 MMOL/L (ref 97–108)
CO2 SERPL-SCNC: 25 MMOL/L (ref 21–32)
CREAT SERPL-MCNC: 0.88 MG/DL (ref 0.55–1.02)
DIFFERENTIAL METHOD BLD: ABNORMAL
EKG ATRIAL RATE: 73 BPM
EKG DIAGNOSIS: NORMAL
EKG P AXIS: 48 DEGREES
EKG P-R INTERVAL: 172 MS
EKG Q-T INTERVAL: 398 MS
EKG QRS DURATION: 82 MS
EKG QTC CALCULATION (BAZETT): 438 MS
EKG R AXIS: 5 DEGREES
EKG T AXIS: 23 DEGREES
EKG VENTRICULAR RATE: 73 BPM
EOSINOPHIL # BLD: 0.2 K/UL (ref 0–0.4)
EOSINOPHIL NFR BLD: 2 % (ref 0–7)
ERYTHROCYTE [DISTWIDTH] IN BLOOD BY AUTOMATED COUNT: 14.1 % (ref 11.5–14.5)
GLUCOSE SERPL-MCNC: 105 MG/DL (ref 65–100)
HCT VFR BLD AUTO: 34.7 % (ref 35–47)
HGB BLD-MCNC: 10.8 G/DL (ref 11.5–16)
IMM GRANULOCYTES # BLD AUTO: 0 K/UL (ref 0–0.04)
IMM GRANULOCYTES NFR BLD AUTO: 0 % (ref 0–0.5)
LYMPHOCYTES # BLD: 3.3 K/UL (ref 0.8–3.5)
LYMPHOCYTES NFR BLD: 46 % (ref 12–49)
MCH RBC QN AUTO: 26.7 PG (ref 26–34)
MCHC RBC AUTO-ENTMCNC: 31.1 G/DL (ref 30–36.5)
MCV RBC AUTO: 85.7 FL (ref 80–99)
MONOCYTES # BLD: 0.6 K/UL (ref 0–1)
MONOCYTES NFR BLD: 8 % (ref 5–13)
NEUTS SEG # BLD: 3.2 K/UL (ref 1.8–8)
NEUTS SEG NFR BLD: 43 % (ref 32–75)
NRBC # BLD: 0 K/UL (ref 0–0.01)
NRBC BLD-RTO: 0 PER 100 WBC
PLATELET # BLD AUTO: 347 K/UL (ref 150–400)
PMV BLD AUTO: 9.1 FL (ref 8.9–12.9)
POTASSIUM SERPL-SCNC: 4 MMOL/L (ref 3.5–5.1)
RBC # BLD AUTO: 4.05 M/UL (ref 3.8–5.2)
SODIUM SERPL-SCNC: 140 MMOL/L (ref 136–145)
TROPONIN I SERPL HS-MCNC: 4 NG/L (ref 0–51)
WBC # BLD AUTO: 7.4 K/UL (ref 3.6–11)

## 2024-08-26 PROCEDURE — 71045 X-RAY EXAM CHEST 1 VIEW: CPT

## 2024-08-26 PROCEDURE — 36415 COLL VENOUS BLD VENIPUNCTURE: CPT

## 2024-08-26 PROCEDURE — 93005 ELECTROCARDIOGRAM TRACING: CPT | Performed by: STUDENT IN AN ORGANIZED HEALTH CARE EDUCATION/TRAINING PROGRAM

## 2024-08-26 PROCEDURE — 99285 EMERGENCY DEPT VISIT HI MDM: CPT

## 2024-08-26 PROCEDURE — 6360000002 HC RX W HCPCS: Performed by: STUDENT IN AN ORGANIZED HEALTH CARE EDUCATION/TRAINING PROGRAM

## 2024-08-26 PROCEDURE — 85025 COMPLETE CBC W/AUTO DIFF WBC: CPT

## 2024-08-26 PROCEDURE — 80048 BASIC METABOLIC PNL TOTAL CA: CPT

## 2024-08-26 PROCEDURE — 84484 ASSAY OF TROPONIN QUANT: CPT

## 2024-08-26 PROCEDURE — 6370000000 HC RX 637 (ALT 250 FOR IP): Performed by: STUDENT IN AN ORGANIZED HEALTH CARE EDUCATION/TRAINING PROGRAM

## 2024-08-26 PROCEDURE — 96374 THER/PROPH/DIAG INJ IV PUSH: CPT

## 2024-08-26 RX ORDER — CYCLOBENZAPRINE HCL 10 MG
10 TABLET ORAL ONCE
Status: COMPLETED | OUTPATIENT
Start: 2024-08-26 | End: 2024-08-26

## 2024-08-26 RX ORDER — TRAMADOL HYDROCHLORIDE 50 MG/1
50 TABLET ORAL EVERY 4 HOURS PRN
Qty: 18 TABLET | Refills: 0 | Status: SHIPPED | OUTPATIENT
Start: 2024-08-26 | End: 2024-08-29

## 2024-08-26 RX ORDER — LIDOCAINE 50 MG/G
1 PATCH TOPICAL DAILY
Qty: 10 PATCH | Refills: 0 | Status: SHIPPED | OUTPATIENT
Start: 2024-08-26 | End: 2024-09-05

## 2024-08-26 RX ORDER — KETOROLAC TROMETHAMINE 15 MG/ML
15 INJECTION, SOLUTION INTRAMUSCULAR; INTRAVENOUS ONCE
Status: COMPLETED | OUTPATIENT
Start: 2024-08-26 | End: 2024-08-26

## 2024-08-26 RX ADMIN — CYCLOBENZAPRINE 10 MG: 10 TABLET, FILM COATED ORAL at 19:11

## 2024-08-26 RX ADMIN — KETOROLAC TROMETHAMINE 15 MG: 15 INJECTION, SOLUTION INTRAMUSCULAR; INTRAVENOUS at 19:11

## 2024-08-26 ASSESSMENT — PAIN SCALES - GENERAL
PAINLEVEL_OUTOF10: 6
PAINLEVEL_OUTOF10: 10

## 2024-08-26 ASSESSMENT — PAIN - FUNCTIONAL ASSESSMENT
PAIN_FUNCTIONAL_ASSESSMENT: 0-10
PAIN_FUNCTIONAL_ASSESSMENT: 0-10

## 2024-08-26 NOTE — ED PROVIDER NOTES
Good    Discharge Note: The patient is stable for discharge home. The signs, symptoms, diagnosis, and discharge instructions have been discussed, understanding conveyed, and agreed upon. The patient is to follow up as recommended or return to ER should their symptoms worsen.      PATIENT REFERRED TO:  Saikna Rangel, [facility]  [address]  [address]  [facility] [place] [facility] [address] [phone]    In 3 days  As needed    [facility] EMERGENCY DEPT  [address] [place] [id number]  [phone]    If symptoms worsen        DISCHARGE MEDICATIONS:     Medication List        START taking these medications      lidocaine 5 %  Commonly known as: LIDODERM  Place 1 patch onto the skin daily for 10 days 12 hours on, 12 hours off.     traMADol 50 MG tablet  Commonly known as: Ultram  Take 1 tablet by mouth every 4 hours as needed for Pain for up to 3 days. Intended supply: 3 days. Take lowest dose possible to manage pain Max Daily Amount: 300 mg            ASK your doctor about these medications      aspirin 81 MG EC tablet     lisinopril 10 MG tablet  Commonly known as: PRINIVIL;ZESTRIL               Where to Get Your Medications        These medications were sent to [id number] - [place], [place] - [address] - P [phone] - F [phone]  [address] [address]      Phone: [phone]   lidocaine 5 %  traMADol 50 MG tablet           DISCONTINUED MEDICATIONS:  Discharge Medication List as of 8/26/2024  7:43 PM          I am the Primary Clinician of Record: Samir Olivas MD (electronically signed)    (Please note that parts of this dictation were completed with voice recognition software. Quite often unanticipated grammatical, syntax, homophones, and other interpretive errors are inadvertently transcribed by the computer software. Please disregards these errors. Please excuse any errors that have escaped final proofreading.)     Samir Olivas MD  08/26/24 1803

## 2024-08-26 NOTE — ED TRIAGE NOTES
C/o left sided neck pain, left arm pain, left arm numbness intermittently over the previous 2 weeks. States pain has increased today.

## 2025-03-26 ENCOUNTER — HOSPITAL ENCOUNTER (EMERGENCY)
Facility: HOSPITAL | Age: 70
Discharge: HOME | End: 2025-03-26
Attending: STUDENT IN AN ORGANIZED HEALTH CARE EDUCATION/TRAINING PROGRAM
Payer: MEDICARE

## 2025-03-26 ENCOUNTER — APPOINTMENT (OUTPATIENT)
Facility: HOSPITAL | Age: 70
End: 2025-03-26
Payer: MEDICARE

## 2025-03-26 VITALS
HEIGHT: 60 IN | RESPIRATION RATE: 18 BRPM | WEIGHT: 245 LBS | OXYGEN SATURATION: 96 % | BODY MASS INDEX: 48.1 KG/M2 | SYSTOLIC BLOOD PRESSURE: 159 MMHG | HEART RATE: 89 BPM | DIASTOLIC BLOOD PRESSURE: 88 MMHG | TEMPERATURE: 98.2 F

## 2025-03-26 DIAGNOSIS — I10 PRIMARY HYPERTENSION: Primary | ICD-10-CM

## 2025-03-26 LAB
ALBUMIN SERPL-MCNC: 3.2 G/DL (ref 3.5–5)
ALBUMIN/GLOB SERPL: 0.7 (ref 1.1–2.2)
ALP SERPL-CCNC: 103 U/L (ref 45–117)
ALT SERPL-CCNC: 18 U/L (ref 12–78)
ANION GAP SERPL CALC-SCNC: 5 MMOL/L (ref 2–12)
AST SERPL W P-5'-P-CCNC: 15 U/L (ref 15–37)
BASOPHILS # BLD: 0.02 K/UL (ref 0–0.1)
BASOPHILS NFR BLD: 0.3 % (ref 0–1)
BILIRUB SERPL-MCNC: 0.2 MG/DL (ref 0.2–1)
BNP SERPL-MCNC: 101 PG/ML
BUN SERPL-MCNC: 13 MG/DL (ref 6–20)
BUN/CREAT SERPL: 14 (ref 12–20)
CA-I BLD-MCNC: 9.7 MG/DL (ref 8.5–10.1)
CHLORIDE SERPL-SCNC: 108 MMOL/L (ref 97–108)
CO2 SERPL-SCNC: 27 MMOL/L (ref 21–32)
CREAT SERPL-MCNC: 0.9 MG/DL (ref 0.55–1.02)
DIFFERENTIAL METHOD BLD: ABNORMAL
EKG ATRIAL RATE: 80 BPM
EKG DIAGNOSIS: NORMAL
EKG P AXIS: 59 DEGREES
EKG P-R INTERVAL: 192 MS
EKG Q-T INTERVAL: 386 MS
EKG QRS DURATION: 94 MS
EKG QTC CALCULATION (BAZETT): 445 MS
EKG R AXIS: -4 DEGREES
EKG T AXIS: 22 DEGREES
EKG VENTRICULAR RATE: 80 BPM
EOSINOPHIL # BLD: 0.17 K/UL (ref 0–0.4)
EOSINOPHIL NFR BLD: 2.8 % (ref 0–7)
ERYTHROCYTE [DISTWIDTH] IN BLOOD BY AUTOMATED COUNT: 13.9 % (ref 11.5–14.5)
GLOBULIN SER CALC-MCNC: 4.3 G/DL (ref 2–4)
GLUCOSE SERPL-MCNC: 107 MG/DL (ref 65–100)
HCT VFR BLD AUTO: 36.1 % (ref 35–47)
HGB BLD-MCNC: 11.2 G/DL (ref 11.5–16)
IMM GRANULOCYTES # BLD AUTO: 0.04 K/UL (ref 0–0.04)
IMM GRANULOCYTES NFR BLD AUTO: 0.6 % (ref 0–0.5)
LYMPHOCYTES # BLD: 2.18 K/UL (ref 0.8–3.5)
LYMPHOCYTES NFR BLD: 35.3 % (ref 12–49)
MCH RBC QN AUTO: 26.9 PG (ref 26–34)
MCHC RBC AUTO-ENTMCNC: 31 G/DL (ref 30–36.5)
MCV RBC AUTO: 86.6 FL (ref 80–99)
MONOCYTES # BLD: 0.54 K/UL (ref 0–1)
MONOCYTES NFR BLD: 8.8 % (ref 5–13)
NEUTS SEG # BLD: 3.22 K/UL (ref 1.8–8)
NEUTS SEG NFR BLD: 52.2 % (ref 32–75)
NRBC # BLD: 0 K/UL (ref 0–0.01)
NRBC BLD-RTO: 0 PER 100 WBC
PLATELET # BLD AUTO: 355 K/UL (ref 150–400)
PMV BLD AUTO: 9.2 FL (ref 8.9–12.9)
POTASSIUM SERPL-SCNC: 4.1 MMOL/L (ref 3.5–5.1)
PROT SERPL-MCNC: 7.5 G/DL (ref 6.4–8.2)
RBC # BLD AUTO: 4.17 M/UL (ref 3.8–5.2)
SODIUM SERPL-SCNC: 140 MMOL/L (ref 136–145)
TROPONIN I SERPL HS-MCNC: 4 NG/L (ref 0–51)
WBC # BLD AUTO: 6.2 K/UL (ref 3.6–11)

## 2025-03-26 PROCEDURE — 99285 EMERGENCY DEPT VISIT HI MDM: CPT

## 2025-03-26 PROCEDURE — 80053 COMPREHEN METABOLIC PANEL: CPT

## 2025-03-26 PROCEDURE — 71045 X-RAY EXAM CHEST 1 VIEW: CPT

## 2025-03-26 PROCEDURE — 84484 ASSAY OF TROPONIN QUANT: CPT

## 2025-03-26 PROCEDURE — 93005 ELECTROCARDIOGRAM TRACING: CPT | Performed by: STUDENT IN AN ORGANIZED HEALTH CARE EDUCATION/TRAINING PROGRAM

## 2025-03-26 PROCEDURE — 85025 COMPLETE CBC W/AUTO DIFF WBC: CPT

## 2025-03-26 PROCEDURE — 36415 COLL VENOUS BLD VENIPUNCTURE: CPT

## 2025-03-26 PROCEDURE — 83880 ASSAY OF NATRIURETIC PEPTIDE: CPT

## 2025-03-26 ASSESSMENT — PAIN SCALES - GENERAL
PAINLEVEL_OUTOF10: 4
PAINLEVEL_OUTOF10: 8

## 2025-03-26 ASSESSMENT — PAIN - FUNCTIONAL ASSESSMENT: PAIN_FUNCTIONAL_ASSESSMENT: 0-10

## 2025-03-26 NOTE — ED TRIAGE NOTES
Pt got up used her home bp machine to check her bp and she was feeling light headed and dizzy, pt then took her meds and her home machine was not working right, pt states it was saying it was high and wouldn't register

## 2025-03-26 NOTE — ED PROVIDER NOTES
[facility] EMERGENCY DEPT  EMERGENCY DEPARTMENT HISTORY AND PHYSICAL EXAM      Date: 3/26/2025  Patient Name: Tavia Williamson  MRN: [id number]  YOB: 1955  Date of evaluation: 3/26/2025  Provider: Samir Olivas MD   Note Started: 9:54 AM EDT 3/26/25    HISTORY OF PRESENT ILLNESS     Chief Complaint   Patient presents with    Hypertension       History Provided By: Patient    HPI: Tavia Williamson is a 69 y.o. female presents to emergency department for evaluation of generalized weakness, headache, chest tightness.  Patient states that symptoms started around 4 AM today, took her blood pressure was concerned that it was very high, 190s systolic.  Took her blood pressure medicine, symptoms did not significantly improve so came to Emergency Department.  Additionally patient was complaining of some mild dyspnea on exertion currently states that the symptoms have improved, chest pain has dissipated, is complaining of mild headache however no blurry vision double vision no nausea vomiting.  Denies any focal weakness in extremity.  PAST MEDICAL HISTORY   Past Medical History:  Past Medical History:   Diagnosis Date    Obesity        Past Surgical History:  Past Surgical History:   Procedure Laterality Date    CARDIAC PROCEDURE N/A 11/9/2023    Right heart cath performed by Markel Mccullough MD at [facility] CARDIAC CATH LAB    CHOLECYSTECTOMY      GASTRECTOMY  2013    gastric sleeve    HYSTERECTOMY (CERVIX STATUS UNKNOWN)  1998    INVASIVE VASCULAR N/A 11/9/2023    Ultrasound guided vascular access performed by Markel Mccullough MD at [facility] CARDIAC CATH LAB       Family History:  Family History   Problem Relation Age of Onset    Cancer Mother     Diabetes Father     Heart Disease Father        Social History:  Social History     Tobacco Use    Smoking status: Never    Smokeless tobacco: Never   Substance Use Topics    Alcohol use: Never    Drug use: Never       Allergies:  Allergies   Allergen Reactions    Milk (Cow)

## 2025-04-11 ENCOUNTER — TRANSCRIBE ORDERS (OUTPATIENT)
Facility: HOSPITAL | Age: 70
End: 2025-04-11

## 2025-04-11 DIAGNOSIS — R60.0 LOCALIZED EDEMA: Primary | ICD-10-CM

## 2025-04-18 ENCOUNTER — HOSPITAL ENCOUNTER (OUTPATIENT)
Facility: HOSPITAL | Age: 70
Discharge: HOME | End: 2025-04-20
Payer: MEDICARE

## 2025-04-18 DIAGNOSIS — R60.0 LOCALIZED EDEMA: ICD-10-CM

## 2025-04-18 PROCEDURE — 93970 EXTREMITY STUDY: CPT

## (undated) DEVICE — 3M™ TEGADERM™ TRANSPARENT FILM DRESSING FRAME STYLE, 1626W, 4 IN X 4-3/4 IN (10 CM X 12 CM), 50/CT 4CT/CASE: Brand: 3M™ TEGADERM™

## (undated) DEVICE — 3M™ STERI-DRAPE™ SMALL DRAPE WITH ADHESIVE APERTURE 1092 25/BX,4/CS&#X20;: Brand: STERI-DRAPE™

## (undated) DEVICE — CATHETER PULM ART 5FR INFL 0.75CC L110CM BAL DIA8MM SGL WDG

## (undated) DEVICE — 48" PROBE COVER W/GEL, ULTRASOUND, STERILE: Brand: SITE-RITE

## (undated) DEVICE — RUNTHROUGH NS EXTRA FLOPPY PTCA GUIDEWIRE: Brand: RUNTHROUGH

## (undated) DEVICE — DEVICE VASC CLSR 5FR GRY W/ INTEGR SEAL 10ML LOK SYR

## (undated) DEVICE — Device

## (undated) DEVICE — SURGICAL PROCEDURE TRAY CRD CATH 3 PRT

## (undated) DEVICE — CATHETER COR DIAG PIGTAILS PIG 145 CRV 5FR 110CM 6 SIDE H

## (undated) DEVICE — WASTEBAG DRIP/ADAPTER: Brand: MEDLINE INDUSTRIES, INC.

## (undated) DEVICE — GLIDESHEATH SLENDER ACCESS KIT: Brand: GLIDESHEATH SLENDER

## (undated) DEVICE — MICROPUNCTURE INTRODUCER SET SILHOUETTE TRANSITIONLESS WITH NITINOL WIRE GUIDE: Brand: MICROPUNCTURE

## (undated) DEVICE — PINNACLE INTRODUCER SHEATH: Brand: PINNACLE

## (undated) DEVICE — GLIDESHEATH SLENDER NITINOL HYDROPHILIC COATED INTRODUCER SHEATH: Brand: GLIDESHEATH SLENDER

## (undated) DEVICE — TUBING ANGIO L30IN ID18MM 1200PSI POLYUR FLX BRAID AIRLESS